# Patient Record
Sex: MALE | Race: ASIAN | NOT HISPANIC OR LATINO | URBAN - METROPOLITAN AREA
[De-identification: names, ages, dates, MRNs, and addresses within clinical notes are randomized per-mention and may not be internally consistent; named-entity substitution may affect disease eponyms.]

---

## 2020-08-03 ENCOUNTER — LAB OUTSIDE AN ENCOUNTER (OUTPATIENT)
Dept: URBAN - METROPOLITAN AREA CLINIC 86 | Facility: CLINIC | Age: 35
End: 2020-08-03

## 2020-08-04 ENCOUNTER — TELEPHONE ENCOUNTER (OUTPATIENT)
Dept: URBAN - METROPOLITAN AREA CLINIC 92 | Facility: CLINIC | Age: 35
End: 2020-08-04

## 2020-08-04 NOTE — HPI-OTHER HISTORIES
Dear  Phol, Aug 3 2020 u.s: [facility]: Liver 17.1cm and prior 16.8cm so similar. Spleen 13.5cm and prior 13.2cm. No focal liver lesions. No ascites. Liver vessels patent.  (this seems to be very different from 2nd report sent on you today also. U.s additional report mentions: No liver lesions. Spleen 12.6cm. 6mm polyp in gb neck and similar to prior exam. Common duct 4mm. No hydronephrosis. No change vs jan 2020.  Called [facility]: they confirmed: the original study you did was the full u/s and then they did the doppler. Tech retook readings on doppler and is known to happen, the readings could be off by 1cm or so and so that is why looked bigger.  Radiology also felt one reading of the spleen little larger than he would suspect and closer to 12.8cm.   The good news is that there are no lesions and gb polyp is stable. These readings as you see can vary even same day depending on how they set the measures.   Dr Gonzalez

## 2020-08-05 ENCOUNTER — TELEPHONE ENCOUNTER (OUTPATIENT)
Dept: URBAN - METROPOLITAN AREA CLINIC 92 | Facility: CLINIC | Age: 35
End: 2020-08-05

## 2020-08-05 LAB
A/G RATIO: 1.8
ALBUMIN: 4.8
ALKALINE PHOSPHATASE: 60
ALT (SGPT): 28
AST (SGOT): 22
BASO (ABSOLUTE): 0
BASOS: 0
BILIRUBIN, TOTAL: 0.5
BUN/CREATININE RATIO: 25
BUN: 19
CALCIUM: 9.1
CARBON DIOXIDE, TOTAL: 22
CHLORIDE: 100
CREATININE: 0.77
EGFR IF AFRICN AM: 137
EGFR IF NONAFRICN AM: 118
EOS (ABSOLUTE): 0.2
EOS: 2
GLOBULIN, TOTAL: 2.6
GLUCOSE: 85
HBSAG CONFIRMATION: POSITIVE
HBSAG SCREEN: (no result)
HBV GENOTYPE + DRUG RESISTANCE: (no result)
HBV IU/ML: <10
HEMATOCRIT: 44.2
HEMATOLOGY COMMENTS:: (no result)
HEMOGLOBIN: 14.5
IMMATURE CELLS: (no result)
IMMATURE GRANS (ABS): 0
IMMATURE GRANULOCYTES: 0
LOG10 HBV AS IU/ML: (no result)
LYMPHS (ABSOLUTE): 2
LYMPHS: 29
MCH: 30.1
MCHC: 32.8
MCV: 92
MONOCYTES(ABSOLUTE): 0.6
MONOCYTES: 8
NEUTROPHILS (ABSOLUTE): 4.3
NEUTROPHILS: 61
NRBC: (no result)
PLATELETS: 236
POTASSIUM: 5
PROTEIN, TOTAL: 7.4
RBC: 4.81
RDW: 12.2
SODIUM: 138
TEST INFORMATION:: (no result)
WBC: 7

## 2020-08-05 NOTE — HPI-OTHER HISTORIES
Dear Marita Sabillon The results of your recent tests are explained below: hep b sag positive. wbc 7.0 hg 14.5 plat 236. mcv 92.  tb 0.5 and alk 60 and ast 22 and alt 28. na 138 and k 5.0 and glu 85 and cr 0.77.  HBV <10.

## 2021-01-25 ENCOUNTER — TELEPHONE ENCOUNTER (OUTPATIENT)
Dept: URBAN - METROPOLITAN AREA CLINIC 86 | Facility: CLINIC | Age: 36
End: 2021-01-25

## 2021-01-26 ENCOUNTER — OFFICE VISIT (OUTPATIENT)
Dept: URBAN - METROPOLITAN AREA CLINIC 86 | Facility: CLINIC | Age: 36
End: 2021-01-26

## 2021-02-18 ENCOUNTER — TELEPHONE ENCOUNTER (OUTPATIENT)
Dept: URBAN - METROPOLITAN AREA CLINIC 92 | Facility: CLINIC | Age: 36
End: 2021-02-18

## 2021-02-18 NOTE — HPI-OTHER HISTORIES
Cirilo Sabillon, Feb 2021: u.s:  no focal hepatic abnormality. No liver lesions. Liver appears unchanged.  Gallbladder polyp 7mm without significant change from prior exam, Spleen normal in size. No kidney hydronephrosis seen. Pancreas obscured by gas. Common duct normal.   Dr Gonzalez

## 2021-02-23 ENCOUNTER — OFFICE VISIT (OUTPATIENT)
Dept: URBAN - METROPOLITAN AREA CLINIC 86 | Facility: CLINIC | Age: 36
End: 2021-02-23
Payer: COMMERCIAL

## 2021-02-23 ENCOUNTER — LAB OUTSIDE AN ENCOUNTER (OUTPATIENT)
Dept: URBAN - METROPOLITAN AREA CLINIC 86 | Facility: CLINIC | Age: 36
End: 2021-02-23

## 2021-02-23 DIAGNOSIS — E66.3 OVERWEIGHT: ICD-10-CM

## 2021-02-23 DIAGNOSIS — Z71.89 VACCINE COUNSELING: ICD-10-CM

## 2021-02-23 DIAGNOSIS — Z79.899 ENCOUNTER FOR LONG-TERM (CURRENT) USE OF HIGH-RISK MEDICATION: ICD-10-CM

## 2021-02-23 DIAGNOSIS — B18.1 CHRONIC HEPATITIS B: ICD-10-CM

## 2021-02-23 DIAGNOSIS — Z98.89 HISTORY OF LIVER BIOPSY: ICD-10-CM

## 2021-02-23 DIAGNOSIS — R89.4 HEPATITIS A ANTIBODY POSITIVE: ICD-10-CM

## 2021-02-23 DIAGNOSIS — K82.4 GALL BLADDER POLYP: ICD-10-CM

## 2021-02-23 PROBLEM — 66071002 HEPATITIS B: Status: ACTIVE | Noted: 2021-02-23

## 2021-02-23 PROCEDURE — 99214 OFFICE O/P EST MOD 30 MIN: CPT

## 2021-02-23 RX ORDER — TENOFOVIR ALAFENAMIDE 25 MG/1
1 TABLET WITH FOOD TABLET ORAL ONCE A DAY
Qty: 30 | Refills: 4
End: 2021-07-23

## 2021-02-23 RX ORDER — TENOFOVIR ALAFENAMIDE 25 MG/1
1 TABLET WITH FOOD TABLET ORAL ONCE A DAY
Status: ACTIVE | COMMUNITY

## 2021-02-23 NOTE — HPI-OTHER HISTORIES
This is a scheduled follow-up appointment for this patient, a 35 year old  male, after a previous visit in jan 2020, for an evaluation for hepatitis B.    He has completed 6 years of treatment with viread. He has been now on vemlidy since spring 2017. The patient has been compliant with his treatment regimen. He has been abstinent from alcohol.      Pt here for follow up for hep b. on vemlidy tx. He has fatty liver on imaging and prior had stated that he was drinking a lot of sodas and we recommended he avoid this.   doing well.  did labs on friday will need those results.   Feb 2021: u.s:  no focal hepatic abnormality. No liver lesions. Liver appears unchanged.  Gallbladder polyp 7mm without significant change from prior exam, Spleen normal in size. No kidney hydronephrosis seen. Pancreas obscured by gas. Common duct normal. Dear Marita Sabillon,  last labs were in aug 2020, need updated labs.  Jan 16 2020 liver mildly increased in echogenicity and liver wnl. Right lobe 14cm and no focal mass. Stable gb focus 7mm and no gallstones. cbd 4mm. No evidence of hydronephrosis. spleen 12.5cmx12.8x5.6cm. Stable gb polyp.                                          Shiv 15 2020 wbc 8.2 hg 14.1 and plat 239 and glu 99 and cr 0.79 and na 140 and k 4.5 and cl 103 and co2 21 and alb 4.6 and tb 0.4 and alk 58 and ast 22 and alt 30 hbv dna not detected.  Dec 4 2019 wbc 7.5 hg 14 and plat 225. glu 95 and cr 0.81 and na 140 and k 4.6 and cl 102 and co2 21 and alb 4.7 and tb 0.7 and 59 andast 21 and alt 27 hbv dna not detected.  7/15/19 wbc 7.2 hgb 14.9 plt 210 gluc 101 cr 0.81 alp 58 ast 21 alt 29 tb 0.7 hep b dna neg

## 2021-02-23 NOTE — PHYSICAL EXAM CHEST:
no lesions,  no deformities,  no traumatic injuries,  no significant scars are present,  chest wall non-tender,  no masses present, breathing is unlabored without accessory muscle use,normal breath sounds , no lesions,  no deformities,  no traumatic injuries,  no significant scars are present,  chest wall non-tender,  no masses present,  tactile fremitus is normal, breathing is unlabored without accessory muscle use,normal breath sounds

## 2021-03-01 ENCOUNTER — TELEPHONE ENCOUNTER (OUTPATIENT)
Dept: URBAN - METROPOLITAN AREA CLINIC 6 | Facility: CLINIC | Age: 36
End: 2021-03-01

## 2021-03-01 RX ORDER — TENOFOVIR ALAFENAMIDE 25 MG/1
1 TABLET WITH FOOD TABLET ORAL ONCE A DAY
Qty: 30 | Refills: 0

## 2021-03-10 ENCOUNTER — TELEPHONE ENCOUNTER (OUTPATIENT)
Dept: URBAN - METROPOLITAN AREA CLINIC 92 | Facility: CLINIC | Age: 36
End: 2021-03-10

## 2021-03-10 RX ORDER — TENOFOVIR ALAFENAMIDE 25 MG/1
1 TABLET WITH FOOD TABLET ORAL ONCE A DAY
Qty: 30 TABLET | Refills: 0 | OUTPATIENT

## 2021-05-23 ENCOUNTER — LAB OUTSIDE AN ENCOUNTER (OUTPATIENT)
Dept: URBAN - METROPOLITAN AREA CLINIC 86 | Facility: CLINIC | Age: 36
End: 2021-05-23

## 2021-08-10 ENCOUNTER — TELEPHONE ENCOUNTER (OUTPATIENT)
Dept: URBAN - METROPOLITAN AREA CLINIC 92 | Facility: CLINIC | Age: 36
End: 2021-08-10

## 2021-08-15 ENCOUNTER — TELEPHONE ENCOUNTER (OUTPATIENT)
Dept: URBAN - METROPOLITAN AREA CLINIC 92 | Facility: CLINIC | Age: 36
End: 2021-08-15

## 2021-08-15 NOTE — HPI-TODAY'S VISIT:
Cirilo Sabillon, August 12 ultrasound shows casted limit the exam. Liver measured 15 cm and the spleen was approximately 13 x 12.6 x 5 cm.  Flow was seen in the portal and hepatic veins with normal physiologic direction. Hepatofugal flow was seen in the hepatic veins. Overall they did feel that the exam was technically difficult but that the patency of the vessels was seen and that the spleen was prominent at 13 cm. Dr. Gonzalez

## 2021-08-23 ENCOUNTER — LAB OUTSIDE AN ENCOUNTER (OUTPATIENT)
Dept: URBAN - METROPOLITAN AREA CLINIC 86 | Facility: CLINIC | Age: 36
End: 2021-08-23

## 2021-08-26 ENCOUNTER — OFFICE VISIT (OUTPATIENT)
Dept: URBAN - METROPOLITAN AREA CLINIC 86 | Facility: CLINIC | Age: 36
End: 2021-08-26
Payer: COMMERCIAL

## 2021-08-26 DIAGNOSIS — Z79.899 ENCOUNTER FOR LONG-TERM (CURRENT) USE OF HIGH-RISK MEDICATION: ICD-10-CM

## 2021-08-26 DIAGNOSIS — K82.4 GALL BLADDER POLYP: ICD-10-CM

## 2021-08-26 DIAGNOSIS — M54.30 SCIATIC LEG PAIN: ICD-10-CM

## 2021-08-26 DIAGNOSIS — E66.3 OVERWEIGHT: ICD-10-CM

## 2021-08-26 DIAGNOSIS — Z71.89 VACCINE COUNSELING: ICD-10-CM

## 2021-08-26 DIAGNOSIS — B18.1 CHRONIC HEPATITIS B: ICD-10-CM

## 2021-08-26 DIAGNOSIS — R89.4 HEPATITIS A ANTIBODY POSITIVE: ICD-10-CM

## 2021-08-26 DIAGNOSIS — E66.09 OTHER OBESITY DUE TO EXCESS CALORIES: ICD-10-CM

## 2021-08-26 DIAGNOSIS — Z98.89 HISTORY OF LIVER BIOPSY: ICD-10-CM

## 2021-08-26 PROCEDURE — 99214 OFFICE O/P EST MOD 30 MIN: CPT

## 2021-08-26 RX ORDER — TENOFOVIR ALAFENAMIDE 25 MG/1
1 TABLET WITH FOOD TABLET ORAL ONCE A DAY
Qty: 30 TABLET | Refills: 5

## 2021-08-26 RX ORDER — TENOFOVIR ALAFENAMIDE 25 MG/1
1 TABLET WITH FOOD TABLET ORAL ONCE A DAY
Qty: 30 TABLET | Refills: 0 | Status: ACTIVE | COMMUNITY

## 2021-08-26 NOTE — HPI-OTHER HISTORIES
This is a scheduled follow-up appointment for this patient, a 35 year old  male, after a previous visit in Feb 2021 , for an evaluation for hepatitis B.     He had completed 6 years of treatment with viread. He has been now on vemlidy since spring 2017. The patient has been compliant with his treatment regimen.    He has been gaining weight with the pandemic and he has gained about 10 pounds iwith the pandemic.   Aug 20 2021 glu 109 and bun 23 and cr 0.83 and na 141 and k 4.4 and cl 105 and co2 22 and alb 4.7 and tb 0.4 and alk 58 and ast 20 and alt 30 and wbc 8.9 and hg 14 and mcv 93 and plat 253.  hbv pending.   August 12 ultrasound shows technically limited exam. Liver measured 15 cm and the spleen was approximately 13 x 12.6 x 5 cm.  Flow was seen in the portal and hepatic veins with normal physiologic direction. Hepatofugal flow was seen in the hepatic veins. Overall they did feel that the exam was technically difficult but that the patency of the vessels was seen and that the spleen was prominent at 13 cm.  He had sciatica and steroids shots x 2 for this pain and he says sciatica better.   Feb 2021: u.s:  no focal hepatic abnormality. No liver lesions. Liver appears unchanged.  Gallbladder polyp 7mm without significant change from prior exam, Spleen normal in size. No kidney hydronephrosis seen. Pancreas obscured by gas. Common duct normal.   Jan 16 2020 liver mildly increased in echogenicity and liver wnl. Right lobe 14cm and no focal mass. Stable gb focus 7mm and no gallstones. cbd 4mm. No evidence of hydronephrosis. spleen 12.5cmx12.8x5.6cm. Stable gb polyp.                                          Shiv 15 2020 wbc 8.2 hg 14.1 and plat 239 and glu 99 and cr 0.79 and na 140 and k 4.5 and cl 103 and co2 21 and alb 4.6 and tb 0.4 and alk 58 and ast 22 and alt 30 hbv dna not detected.  Dec 4 2019 wbc 7.5 hg 14 and plat 225. glu 95 and cr 0.81 and na 140 and k 4.6 and cl 102 and co2 21 and alb 4.7 and tb 0.7 and 59 andast 21 and alt 27 hbv dna not detected.  7/15/19 wbc 7.2 hgb 14.9 plt 210 gluc 101 cr 0.81 alp 58 ast 21 alt 29 tb 0.7 hep b dna neg   Plan: 1. Need to try to walk every day 15-30 min to help with the risk for weight gain. 2. Sciatica affected him. 3. Pt will do labs in 3 and 6m. 4. U,s in 6m and hopefully get better look. 5. Pt will stay on the vemlidy,  Stressed to pt the need for social distancing and strict handwashing and wearing a mask and to follow any other new or added CDC recommendations as this is an evolving target.   Duration of the visit was 30  minutes of this face to face visit with time spent eviewing recent records current status and future plans for the patient.

## 2021-08-26 NOTE — EXAM-PHYSICAL EXAM
Gen: awake and responsive. Eyes: anicteric, normal lids. Mouth: covered with mask. Nose: covered with mask. Hearing: intact grossly. Neck: trachea midline and no jvd. CV: RRR no s3. Lungs: clear. No wheezes, Abd: Soft, nabs, mildly obese and NT. No hsm. ExtL no sig edema, some palm erythema. Neuro: moves all 4 ext grossly. No asterixis. Skin: no pruritis and some palm erythema.

## 2021-08-27 LAB
A/G RATIO: 1.8
ALBUMIN: 4.7
ALKALINE PHOSPHATASE: 58
ALT (SGPT): 30
AST (SGOT): 20
BASO (ABSOLUTE): 0.1
BASOS: 1
BILIRUBIN, TOTAL: 0.4
BUN/CREATININE RATIO: 28
BUN: 23
CALCIUM: 9.2
CARBON DIOXIDE, TOTAL: 22
CHLORIDE: 105
CREATININE: 0.83
EGFR IF AFRICN AM: 132
EGFR IF NONAFRICN AM: 114
EOS (ABSOLUTE): 0.2
EOS: 2
GLOBULIN, TOTAL: 2.6
GLUCOSE: 109
HBV LOG10: (no result)
HEMATOCRIT: 43.3
HEMATOLOGY COMMENTS:: (no result)
HEMOGLOBIN: 14
HEPATITIS B QUANTITATION: (no result)
IMMATURE CELLS: (no result)
IMMATURE GRANS (ABS): 0
IMMATURE GRANULOCYTES: 0
LYMPHS (ABSOLUTE): 2.4
LYMPHS: 27
MCH: 30.1
MCHC: 32.3
MCV: 93
MONOCYTES(ABSOLUTE): 0.6
MONOCYTES: 7
NEUTROPHILS (ABSOLUTE): 5.7
NEUTROPHILS: 63
NRBC: (no result)
PLATELETS: 253
POTASSIUM: 4.4
PROTEIN, TOTAL: 7.3
RBC: 4.65
RDW: 12.3
SODIUM: 141
TEST INFORMATION:: (no result)
WBC: 8.9

## 2021-11-15 ENCOUNTER — LAB OUTSIDE AN ENCOUNTER (OUTPATIENT)
Dept: URBAN - METROPOLITAN AREA CLINIC 86 | Facility: CLINIC | Age: 36
End: 2021-11-15

## 2022-01-31 ENCOUNTER — ERX REFILL RESPONSE (OUTPATIENT)
Dept: URBAN - METROPOLITAN AREA CLINIC 86 | Facility: CLINIC | Age: 37
End: 2022-01-31

## 2022-01-31 RX ORDER — TENOFOVIR ALAFENAMIDE 25 MG/1
1 TABLET WITH FOOD TABLET ORAL ONCE A DAY
Qty: 30 TABLET | Refills: 5 | OUTPATIENT

## 2022-01-31 RX ORDER — TENOFOVIR ALAFENAMIDE 25 MG/1
TAKE 1 TABLET BY MOUTH ONCE DAILY WITH FOOD TABLET ORAL
Qty: 30 TABLET | Refills: 1 | OUTPATIENT

## 2022-02-14 ENCOUNTER — LAB OUTSIDE AN ENCOUNTER (OUTPATIENT)
Dept: URBAN - METROPOLITAN AREA CLINIC 86 | Facility: CLINIC | Age: 37
End: 2022-02-14

## 2022-02-15 ENCOUNTER — LAB OUTSIDE AN ENCOUNTER (OUTPATIENT)
Dept: URBAN - METROPOLITAN AREA CLINIC 86 | Facility: CLINIC | Age: 37
End: 2022-02-15

## 2022-02-22 ENCOUNTER — TELEPHONE ENCOUNTER (OUTPATIENT)
Dept: URBAN - METROPOLITAN AREA CLINIC 92 | Facility: CLINIC | Age: 37
End: 2022-02-22

## 2022-02-22 NOTE — HPI-TODAY'S VISIT:
Cirilo Sabillon, February 17 [place] [facility] report in. Liver showed slightly increased echogenicity without definite focal lesion. Spleen was normal at 12.3 cm.  Liver vessels patent. Overall they felt the liver was fatty and that you had patent vessels. Dr. Gonzalez

## 2022-03-01 ENCOUNTER — TELEPHONE ENCOUNTER (OUTPATIENT)
Dept: URBAN - METROPOLITAN AREA CLINIC 92 | Facility: CLINIC | Age: 37
End: 2022-03-01

## 2022-03-01 LAB
A/G RATIO: 1.8
ALBUMIN: 4.7
ALKALINE PHOSPHATASE: 64
ALT (SGPT): 34
AST (SGOT): 24
BASO (ABSOLUTE): 0.1
BASOS: 1
BILIRUBIN, TOTAL: 0.6
BUN/CREATININE RATIO: 20
BUN: 17
CALCIUM: 9.2
CARBON DIOXIDE, TOTAL: 22
CHLORIDE: 103
CREATININE: 0.87
EGFR IF AFRICN AM: 128
EGFR IF NONAFRICN AM: 111
EOS (ABSOLUTE): 0.3
EOS: 3
GLOBULIN, TOTAL: 2.6
GLUCOSE: 99
HBSAG CONFIRMATION: POSITIVE
HBSAG SCREEN: (no result)
HBV LOG10: (no result)
HEMATOCRIT: 44.2
HEMATOLOGY COMMENTS:: (no result)
HEMOGLOBIN: 14.5
HEPATITIS B QUANTITATION: (no result)
IMMATURE CELLS: (no result)
IMMATURE GRANS (ABS): 0
IMMATURE GRANULOCYTES: 0
LYMPHS (ABSOLUTE): 2.1
LYMPHS: 24
MCH: 30.5
MCHC: 32.8
MCV: 93
MONOCYTES(ABSOLUTE): 0.7
MONOCYTES: 8
NEUTROPHILS (ABSOLUTE): 5.7
NEUTROPHILS: 64
NRBC: (no result)
PLATELETS: 217
POTASSIUM: 4.6
PROTEIN, TOTAL: 7.3
RBC: 4.76
RDW: 12.3
SODIUM: 139
TEST INFORMATION:: (no result)
WBC: 9

## 2022-03-01 NOTE — HPI-TODAY'S VISIT:
Cirilo Sabillon. Feb 15: hbv dna not detected so well suppressed. Glu 99 and bun 17 and cr 0.87 (up from 0.77 but still normal.  Sodium 139 potassium 4.6 calcium 9.2 albumin 4.7 bilirubin 0.6 alkaline phosphatase 64 AST 24 and ALT 34.  Previously AST 22 and ALT 28.  Ideal ALT is less than 35.  Is anything changing to explain why the liver labs are drifting up?  White blood cell count 9 hemoglobin 14.5 platelet count 217 MCV 93.  Neutrophils 5.7 lymphocytes 2.1. Hep B surface antigen remains positive. Need to follow labs every 3m. Dr Gonzalez

## 2022-03-03 ENCOUNTER — OFFICE VISIT (OUTPATIENT)
Dept: URBAN - METROPOLITAN AREA CLINIC 86 | Facility: CLINIC | Age: 37
End: 2022-03-03
Payer: COMMERCIAL

## 2022-03-03 ENCOUNTER — LAB OUTSIDE AN ENCOUNTER (OUTPATIENT)
Dept: URBAN - METROPOLITAN AREA CLINIC 86 | Facility: CLINIC | Age: 37
End: 2022-03-03

## 2022-03-03 DIAGNOSIS — M54.30 SCIATIC LEG PAIN: ICD-10-CM

## 2022-03-03 DIAGNOSIS — Z79.899 ENCOUNTER FOR LONG-TERM (CURRENT) USE OF HIGH-RISK MEDICATION: ICD-10-CM

## 2022-03-03 DIAGNOSIS — K82.4 GALL BLADDER POLYP: ICD-10-CM

## 2022-03-03 DIAGNOSIS — B18.1 CHRONIC HEPATITIS B: ICD-10-CM

## 2022-03-03 DIAGNOSIS — Z71.89 VACCINE COUNSELING: ICD-10-CM

## 2022-03-03 DIAGNOSIS — E66.3 OVERWEIGHT: ICD-10-CM

## 2022-03-03 DIAGNOSIS — R89.4 HEPATITIS A ANTIBODY POSITIVE: ICD-10-CM

## 2022-03-03 DIAGNOSIS — E66.09 OTHER OBESITY DUE TO EXCESS CALORIES: ICD-10-CM

## 2022-03-03 PROCEDURE — 99214 OFFICE O/P EST MOD 30 MIN: CPT

## 2022-03-03 RX ORDER — TENOFOVIR ALAFENAMIDE 25 MG/1
1 TABLET WITH FOOD TABLET ORAL ONCE A DAY
Qty: 30 TABLET | Refills: 5

## 2022-03-03 RX ORDER — TENOFOVIR ALAFENAMIDE 25 MG/1
TAKE 1 TABLET BY MOUTH ONCE DAILY WITH FOOD TABLET ORAL
Qty: 30 TABLET | Refills: 1 | Status: ACTIVE | COMMUNITY

## 2022-03-10 ENCOUNTER — TELEPHONE ENCOUNTER (OUTPATIENT)
Dept: URBAN - METROPOLITAN AREA CLINIC 92 | Facility: CLINIC | Age: 37
End: 2022-03-10

## 2022-03-10 NOTE — HPI-TODAY'S VISIT:
Dear Marita Sabillon, February 22 labs able to be tracked down.   White blood cell count 9 hemoglobin 13.5 platelet count 217 MCV 93 and neutrophils 5.7 and lymphocytes 2.1.  These are all normal range.  Glucose better at 99 BUN is 17 creatinine 0.87 sodium 139 potassium 4.6 chloride 103 CO2 of 22 albumin 4.7 bilirubin 0.6 alkaline phosphatase 64 AST 24 ALT 34.  Previously alk phos 58 AST 20 and ALT 30 so these labs are very close to that size but slightly higher.  Ideal ALT is still less than 35 so doing well.  Hep B surface antigen is positive. Have seen in many pts, that during winter some weight gain and decreased exercise can affect the liver labs. Hopefully better weather now will allow for more ability to do exercise and work on the weight to help this. Dr Gonzalez

## 2022-06-03 ENCOUNTER — LAB OUTSIDE AN ENCOUNTER (OUTPATIENT)
Dept: URBAN - METROPOLITAN AREA CLINIC 86 | Facility: CLINIC | Age: 37
End: 2022-06-03

## 2022-08-22 ENCOUNTER — LAB OUTSIDE AN ENCOUNTER (OUTPATIENT)
Dept: URBAN - METROPOLITAN AREA CLINIC 86 | Facility: CLINIC | Age: 37
End: 2022-08-22

## 2022-09-01 ENCOUNTER — TELEPHONE ENCOUNTER (OUTPATIENT)
Dept: URBAN - METROPOLITAN AREA CLINIC 92 | Facility: CLINIC | Age: 37
End: 2022-09-01

## 2022-09-01 LAB
A/G RATIO: 1.6
ALBUMIN: 4.5
ALKALINE PHOSPHATASE: 62
ALT (SGPT): 27
AST (SGOT): 19
BASO (ABSOLUTE): 0
BASOS: 0
BILIRUBIN, TOTAL: 0.5
BUN/CREATININE RATIO: 20
BUN: 17
CALCIUM: 9.2
CARBON DIOXIDE, TOTAL: 25
CHLORIDE: 104
CREATININE: 0.84
EGFR: 116
EOS (ABSOLUTE): 0.2
EOS: 2
GLOBULIN, TOTAL: 2.8
GLUCOSE: 105
HBSAG CONFIRMATION: POSITIVE
HBSAG SCREEN: (no result)
HBV LOG10: (no result)
HEMATOCRIT: 45.6
HEMATOLOGY COMMENTS:: (no result)
HEMOGLOBIN: 14.3
HEPATITIS B QUANTITATION: (no result)
IMMATURE CELLS: (no result)
IMMATURE GRANS (ABS): 0
IMMATURE GRANULOCYTES: 0
LYMPHS (ABSOLUTE): 2.8
LYMPHS: 31
MCH: 29.2
MCHC: 31.4
MCV: 93
MONOCYTES(ABSOLUTE): 0.7
MONOCYTES: 8
NEUTROPHILS (ABSOLUTE): 5.3
NEUTROPHILS: 59
NRBC: (no result)
PLATELETS: 235
POTASSIUM: 4.2
PROTEIN, TOTAL: 7.3
RBC: 4.89
RDW: 12.3
SODIUM: 141
TEST INFORMATION:: (no result)
WBC: 9.1

## 2022-09-01 NOTE — HPI-TODAY'S VISIT:
Dear Marita Sabillon, August 23 labs show glucose slightly up at 105 and possibly you were not fasting this day?  Please share with primary provider. BUN of 17 creatinine 0.84 sodium 141 potassium 4.2 calcium 9.2 albumin 4.5 bilirubin 0.5 alkaline phosphatase 62 AST 19 ALT 27.  Back in February her AST was 24 and ALT 34 so these are even lower.  Any changes of weight or medicines on to explain this?   White blood cell count 9.1 hemoglobin 14.3 platelet count 235 MCV 93 and these are normal.  Your mean corpuscular hemoglobin concentration which was previously normal was slightly low this time at 31.4 and please share with primary provider. Neutrophils were normal at 5.3 lymphocytes 2.8.  Hep B surface antigen remains confirmed indicated and positive on the confirmation. Hep B DNA remains not detected. Dr. Gonzalez

## 2022-09-02 ENCOUNTER — OFFICE VISIT (OUTPATIENT)
Dept: URBAN - METROPOLITAN AREA TELEHEALTH 2 | Facility: TELEHEALTH | Age: 37
End: 2022-09-02
Payer: COMMERCIAL

## 2022-09-02 VITALS — HEIGHT: 68 IN | BODY MASS INDEX: 33.34 KG/M2 | WEIGHT: 220 LBS

## 2022-09-02 DIAGNOSIS — K82.4 GALL BLADDER POLYP: ICD-10-CM

## 2022-09-02 DIAGNOSIS — Z71.89 VACCINE COUNSELING: ICD-10-CM

## 2022-09-02 DIAGNOSIS — B18.1 CHRONIC HEPATITIS B: ICD-10-CM

## 2022-09-02 DIAGNOSIS — K76.0 FATTY LIVER: ICD-10-CM

## 2022-09-02 DIAGNOSIS — R89.4 HEPATITIS A ANTIBODY POSITIVE: ICD-10-CM

## 2022-09-02 DIAGNOSIS — E66.09 OTHER OBESITY DUE TO EXCESS CALORIES: ICD-10-CM

## 2022-09-02 DIAGNOSIS — M54.30 SCIATIC LEG PAIN: ICD-10-CM

## 2022-09-02 DIAGNOSIS — E66.3 OVERWEIGHT: ICD-10-CM

## 2022-09-02 PROCEDURE — 99214 OFFICE O/P EST MOD 30 MIN: CPT

## 2022-09-02 RX ORDER — TENOFOVIR ALAFENAMIDE 25 MG/1
1 TABLET WITH FOOD TABLET ORAL ONCE A DAY
Qty: 30 TABLET | Refills: 5

## 2022-09-02 RX ORDER — TENOFOVIR ALAFENAMIDE 25 MG/1
1 TABLET WITH FOOD TABLET ORAL ONCE A DAY
Qty: 30 TABLET | Refills: 5 | Status: ACTIVE | COMMUNITY

## 2022-09-02 NOTE — HPI-TODAY'S VISIT:
This is a scheduled follow-up appointment for this patient, a 36 year old  male, after a previous visit in March 2022 , for an evaluation for hepatitis B and on tx response.  He had completed 6 years of treatment with viread. He has been now on vemlidy since spring 2017. The patient has been compliant with his treatment regimen.   August 23 labs show glucose slightly up at 105 and possibly you were not fasting this day?  Please share with primary provider. BUN of 17 creatinine 0.84 sodium 141 potassium 4.2 calcium 9.2 albumin 4.5 bilirubin 0.5 alkaline phosphatase 62 AST 19 ALT 27.  Back in February her AST was 24 and ALT 34 so these are even lower.  Any changes of weight or medicines on to explain this?   He says that may have lost some weight. Working out. White blood cell count 9.1 hemoglobin 14.3 platelet count 235 MCV 93 and these are normal.  Your mean corpuscular hemoglobin concentration which was previously normal was slightly low this time at 31.4 and please share with primary provider. Neutrophils were normal at 5.3 lymphocytes 2.8.  Hep B surface antigen remains confirmed indicated and positive on the confirmation. Hep B DNA remains not detected.  As for u.s done last fri at [facility] in [place].  February 22 labs able to be tracked down.   White blood cell count 9 hemoglobin 13.5 platelet count 217 MCV 93 and neutrophils 5.7 and lymphocytes 2.1.  These are all normal range.  Glucose better at 99 BUN is 17 creatinine 0.87 sodium 139 potassium 4.6 chloride 103 CO2 of 22 albumin 4.7 bilirubin 0.6 alkaline phosphatase 64 AST 24 ALT 34.  Previously alk phos 58 AST 20 and ALT 30 so these labs are very close to that size but slightly higher.  Ideal ALT is still less than 35 so doing well.  Hep B surface antigen is positive. Have seen in many pts, that during winter some weight gain and decreased exercise can affect the liver labs. Hopefully better weather now will allow for more ability to do exercise and work on the weight to help this.  Last visit he thought had gained 10 pounds and he thinks he has lost some of it.   February 17 [place] [facility] report in. Liver showed slightly increased echogenicity without definite focal lesion. Spleen was normal at 12.3 cm.  Liver vessels patent. Overall they felt the liver was fatty and that you had patent vessels.   Aug 20 2021 glu 109 and bun 23 and cr 0.83 and na 141 and k 4.4 and cl 105 and co2 22 and alb 4.7 and tb 0.4 and alk 58 and ast 20 and alt 30 and wbc 8.9 and hg 14 and mcv 93 and plat 253.  hbv pending.   August 12 ultrasound shows technically limited exam. Liver measured 15 cm and the spleen was approximately 13 x 12.6 x 5 cm.  Flow was seen in the portal and hepatic veins with normal physiologic direction. Hepatofugal flow was seen in the hepatic veins. Overall they did feel that the exam was technically difficult but that the patency of the vessels was seen and that the spleen was prominent at 13 cm.  He had sciatica and steroids shots x 2 for this pain prior and he says sciatica is off and on.  Feb 2021: u.s:  no focal hepatic abnormality. No liver lesions. Liver appears unchanged.  Gallbladder polyp 7mm without significant change from prior exam, Spleen normal in size. No kidney hydronephrosis seen. Pancreas obscured by gas. Common duct normal.   Jan 16 2020 liver mildly increased in echogenicity and liver wnl. Right lobe 14cm and no focal mass. Stable gb focus 7mm and no gallstones. cbd 4mm. No evidence of hydronephrosis. spleen 12.5cmx12.8x5.6cm. Stable gb polyp.                                          Shiv 15 2020 wbc 8.2 hg 14.1 and plat 239 and glu 99 and cr 0.79 and na 140 and k 4.5 and cl 103 and co2 21 and alb 4.6 and tb 0.4 and alk 58 and ast 22 and alt 30 hbv dna not detected.  Dec 4 2019 wbc 7.5 hg 14 and plat 225. glu 95 and cr 0.81 and na 140 and k 4.6 and cl 102 and co2 21 and alb 4.7 and tb 0.7 and 59 andast 21 and alt 27 hbv dna not detected.  7/15/19 wbc 7.2 hgb 14.9 plt 210 gluc 101 cr 0.81 alp 58 ast 21 alt 29 tb 0.7 hep b dna neg   Plan: 1. [facility]s in 6m at [facility] and check one he did. 2. Pt will do lans in 3m and 6m. 3. Pt will call if issues. 4. vemldiy via [facility] rx.   Stressed to pt the need for social distancing and strict handwashing and wearing a mask and to follow any other new or added CDC recommendations as this is an evolving target.  Duration of the visit was 30 minutes with 10 min of chart prep and then 20 min via [employer]  with time spent reviewing recent records, his  current status and future plans for the patient.

## 2022-09-03 ENCOUNTER — LAB OUTSIDE AN ENCOUNTER (OUTPATIENT)
Dept: URBAN - METROPOLITAN AREA CLINIC 86 | Facility: CLINIC | Age: 37
End: 2022-09-03

## 2022-09-04 ENCOUNTER — TELEPHONE ENCOUNTER (OUTPATIENT)
Dept: URBAN - METROPOLITAN AREA CLINIC 92 | Facility: CLINIC | Age: 37
End: 2022-09-04

## 2022-09-04 NOTE — HPI-TODAY'S VISIT:
Dear Marita Sabillon, They were able to get the August 26 ultrasound from [facility] in [place] scanned in. They mention that the exam was impaired by body habitus but that the vascular flow in the main, left, and right portal veins as well as the 3 hepatic veins and hepatic artery was seen and appeared to be patent as dxpected. The liver appeared normal.   Pancreas was normal were seen. There was a 6-7 mm possible polyp versus adherent stone seen in the gallbladder and that it was seen back in 2018 as well.  They stated that it overall appeared stable to 2018. GB wall 3mm which was also stable. Common bile duct was nondilated. Kidneys were normal.  Spleen was 12.3 cm. The overall they felt that you had a borderline enlarged spleen but with no vascular abnormality seen to the liver.  Typically for the gallbladder polyp, we will check this again in 6 months as well.   Good to note the stability seen as it does not appear to be showing any signs of change for 4 yrs now. Dr Gonzalez

## 2022-10-12 ENCOUNTER — TELEPHONE ENCOUNTER (OUTPATIENT)
Dept: URBAN - METROPOLITAN AREA CLINIC 92 | Facility: CLINIC | Age: 37
End: 2022-10-12

## 2022-10-12 NOTE — HPI-TODAY'S VISIT:
Cirilo Sabillon,  Aug 26: 2022:  Exam impaired by body habitus. Flow was seen in the main and left and right portal veins and liver normal. Pancreas normal where seen. Stone 6x7mm and polyp or stone. Seen dating back to 2018. Borderline wall thickening at 3mm and stable to 2018. CBD no dilation and spleen 12.3cm and roughlsimlar to prior. No vascular abnormalities. Aug 29 signed Dr Alexandru Chew radiologist.  They also had sent today the Aug 12 2021 u.s on Oct 10 2022 reread with an addendum: Upper abdominal aorta and ivc are unremarkable. Pancreas is partly seen demonstrating no abnormalities. Liver is borderline in echogenicity likely due to fatty infiltration or chronic inflammation. Right kidney 12.1 cm. Cortical medullary differentiation is maintained.  No hydronephrosis is identified. 7x5 mm gallstone with no wall thickening or pericholecystic fluid.  Common bile duct is 4.3mm. Right common iliac is 1.2x1.3cm  and upper left common iliac is 1.4x1.2cm.  Spleen 12.1 cm with no abnormalities. Left kidney 12.4cm. Liver is increased echogenicity suggesting fatty infiltration vs chronic inflammation. Gallstones and negative rodriguez sign. Some ectasia of the maura arteries bilaterally. Prior: reading much more limited and they said it was limited due to gas.  So they did this addendum. We compared to the new reading Not sure why the differences on that report. Spoke with pt and reviewed the reports with him. Dr Gonzalez

## 2022-11-14 ENCOUNTER — LAB OUTSIDE AN ENCOUNTER (OUTPATIENT)
Dept: URBAN - METROPOLITAN AREA TELEHEALTH 2 | Facility: TELEHEALTH | Age: 37
End: 2022-11-14

## 2023-02-14 ENCOUNTER — LAB OUTSIDE AN ENCOUNTER (OUTPATIENT)
Dept: URBAN - METROPOLITAN AREA TELEHEALTH 2 | Facility: TELEHEALTH | Age: 38
End: 2023-02-14

## 2023-02-27 ENCOUNTER — LAB OUTSIDE AN ENCOUNTER (OUTPATIENT)
Dept: URBAN - METROPOLITAN AREA TELEHEALTH 2 | Facility: TELEHEALTH | Age: 38
End: 2023-02-27

## 2023-03-01 ENCOUNTER — TELEPHONE ENCOUNTER (OUTPATIENT)
Dept: URBAN - METROPOLITAN AREA CLINIC 86 | Facility: CLINIC | Age: 38
End: 2023-03-01

## 2023-03-02 ENCOUNTER — LAB OUTSIDE AN ENCOUNTER (OUTPATIENT)
Dept: URBAN - METROPOLITAN AREA CLINIC 86 | Facility: CLINIC | Age: 38
End: 2023-03-02

## 2023-03-02 ENCOUNTER — OFFICE VISIT (OUTPATIENT)
Dept: URBAN - METROPOLITAN AREA CLINIC 86 | Facility: CLINIC | Age: 38
End: 2023-03-02

## 2023-03-05 ENCOUNTER — TELEPHONE ENCOUNTER (OUTPATIENT)
Dept: URBAN - METROPOLITAN AREA CLINIC 92 | Facility: CLINIC | Age: 38
End: 2023-03-05

## 2023-03-05 NOTE — HPI-TODAY'S VISIT:
Cirilo Sabillon, March 2 [facility] from [place] ultrasound shows spleen normal at 12 cm and liver 14.8 to 14.9 cm with diffusely increased/coarsened echogenicity.  Portal vessels had normal directional flow and hepatic vessels had normal flow.  Spleen vessels also were patent. Gallbladder contained a round echogenic structure 0.6 x 0.7 x 0.7 cm that could be a polyp which had not significantly changed versus December 31, 2018 when allowing for differences in technique. Common bile duct was normal at 4 mm. Right kidney 12.3 cm and left kidney 13 cm.  Spleen was normal at 11.1 cm. They summarized that you had a stable gallbladder polyp which is not significant change versus 2018 but she had a diffusely echogenic and coarsened appearing liver which they felt could be related to hepatitis but as you know your hepatitis B  treatment so that could also be related to fat addition to liver or other process.  We will discuss this and course with you and consider doing MRI as a follow up for this at the next visit. Dr Gonzalez

## 2023-03-07 ENCOUNTER — TELEPHONE ENCOUNTER (OUTPATIENT)
Dept: URBAN - METROPOLITAN AREA CLINIC 92 | Facility: CLINIC | Age: 38
End: 2023-03-07

## 2023-03-07 LAB
A/G RATIO: 1.9
ALBUMIN: 4.8
ALKALINE PHOSPHATASE: 66
ALT (SGPT): 30
AMBIG ABBREV CMP14 DEFAULT: (no result)
AST (SGOT): 20
BASO (ABSOLUTE): 0.1
BASOS: 1
BILIRUBIN, TOTAL: 0.7
BUN/CREATININE RATIO: 16
BUN: 13
CALCIUM: 9.4
CARBON DIOXIDE, TOTAL: 25
CHLORIDE: 104
CREATININE: 0.83
EGFR: 116
EOS (ABSOLUTE): 0.4
EOS: 4
GLOBULIN, TOTAL: 2.5
GLUCOSE: 83
HBSAG CONFIRMATION: POSITIVE
HBSAG SCREEN: (no result)
HBV LOG10: (no result)
HEMATOCRIT: 47.2
HEMATOLOGY COMMENTS:: (no result)
HEMOGLOBIN: 15.1
HEPATITIS B QUANTITATION: (no result)
IMMATURE CELLS: (no result)
IMMATURE GRANS (ABS): 0
IMMATURE GRANULOCYTES: 1
LYMPHS (ABSOLUTE): 2.8
LYMPHS: 32
MCH: 29.1
MCHC: 32
MCV: 91
MONOCYTES(ABSOLUTE): 0.6
MONOCYTES: 7
NEUTROPHILS (ABSOLUTE): 4.9
NEUTROPHILS: 55
NRBC: (no result)
PLATELETS: 234
POTASSIUM: 4.7
PROTEIN, TOTAL: 7.3
RBC: 5.19
RDW: 12.2
SODIUM: 143
TEST INFORMATION:: (no result)
WBC: 8.8

## 2023-03-07 NOTE — HPI-TODAY'S VISIT:
Dear Marita Sabillon, March 2 labs show glucose of 83 which is normal BUN of 13 creatinine 0.83 sodium 143 potassium 4.7 albumin 4.8 bilirubin 0.7 alk phos 66 AST 20 and ALT 30.  Ideal ALT less than 35. Hep B DNA remains not detected. White blood cell count 8.8 hemoglobin 15.1 platelet count 234 MCV 91 and normal neutrophils and lymphocytes. Hep B surface antigen remains confirm indicated but positive.  Hopefully that will clear but again we will continue to monitor for that possibility. Dr. Gonzalez

## 2023-03-09 ENCOUNTER — WEB ENCOUNTER (OUTPATIENT)
Dept: URBAN - METROPOLITAN AREA CLINIC 86 | Facility: CLINIC | Age: 38
End: 2023-03-09

## 2023-03-09 ENCOUNTER — OFFICE VISIT (OUTPATIENT)
Dept: URBAN - METROPOLITAN AREA CLINIC 86 | Facility: CLINIC | Age: 38
End: 2023-03-09
Payer: COMMERCIAL

## 2023-03-09 VITALS
BODY MASS INDEX: 36.68 KG/M2 | SYSTOLIC BLOOD PRESSURE: 128 MMHG | HEIGHT: 68 IN | HEART RATE: 72 BPM | DIASTOLIC BLOOD PRESSURE: 82 MMHG | WEIGHT: 242 LBS | TEMPERATURE: 97.1 F

## 2023-03-09 DIAGNOSIS — K76.0 FATTY LIVER: ICD-10-CM

## 2023-03-09 DIAGNOSIS — Z79.899 ENCOUNTER FOR LONG-TERM (CURRENT) USE OF HIGH-RISK MEDICATION: ICD-10-CM

## 2023-03-09 DIAGNOSIS — K82.4 GALL BLADDER POLYP: ICD-10-CM

## 2023-03-09 DIAGNOSIS — R89.4 HEPATITIS A ANTIBODY POSITIVE: ICD-10-CM

## 2023-03-09 DIAGNOSIS — B18.1 CHRONIC HEPATITIS B: ICD-10-CM

## 2023-03-09 DIAGNOSIS — E66.09 OTHER OBESITY DUE TO EXCESS CALORIES: ICD-10-CM

## 2023-03-09 DIAGNOSIS — Z71.89 VACCINE COUNSELING: ICD-10-CM

## 2023-03-09 DIAGNOSIS — M54.30 SCIATIC LEG PAIN: ICD-10-CM

## 2023-03-09 DIAGNOSIS — E66.3 OVERWEIGHT: ICD-10-CM

## 2023-03-09 PROBLEM — 197321007 FATTY LIVER: Status: ACTIVE | Noted: 2022-09-02

## 2023-03-09 PROBLEM — 162864005: Status: ACTIVE | Noted: 2021-08-26

## 2023-03-09 PROBLEM — 23056005: Status: ACTIVE | Noted: 2021-08-26

## 2023-03-09 PROBLEM — 414916001: Status: ACTIVE | Noted: 2021-08-26

## 2023-03-09 PROCEDURE — 99214 OFFICE O/P EST MOD 30 MIN: CPT | Performed by: PHYSICIAN ASSISTANT

## 2023-03-09 RX ORDER — TENOFOVIR ALAFENAMIDE 25 MG/1
1 TABLET WITH FOOD TABLET ORAL ONCE A DAY
Qty: 30 TABLET | Refills: 5 | Status: ACTIVE | COMMUNITY

## 2023-03-09 RX ORDER — TENOFOVIR ALAFENAMIDE 25 MG/1
1 TABLET WITH FOOD TABLET ORAL ONCE A DAY
Qty: 30 TABLET | Refills: 5

## 2023-03-09 NOTE — HPI-TODAY'S VISIT:
This is a scheduled follow-up appointment for this patient, a 37 year old  male, after a previous visit in Sept 2022 with Dr. Mekhi Gonzalez, for an evaluation for hepatitis B and on tx response.  He had completed 6 years of treatment with viread. He has been now on vemlidy since spring 2017. The patient has been compliant with his treatment regimen.   3/9/23 March 2 AHI from [place] ultrasound shows spleen normal at 12 cm and liver 14.8 to 14.9 cm with diffusely increased/coarsened echogenicity.  Portal vessels had normal directional flow and hepatic vessels had normal flow.  Spleen vessels also were patent. Gallbladder contained a round echogenic structure 0.6 x 0.7 x 0.7 cm that could be a polyp which had not significantly changed versus December 31, 2018 when allowing for differences in technique. Common bile duct was normal at 4 mm. Right kidney 12.3 cm and left kidney 13 cm.  Spleen was normal at 11.1 cm. They summarized that you had a stable gallbladder polyp which is not significant change versus 2018 but she had a diffusely echogenic and coarsened appearing liver which they felt could be related to hepatitis but as you know your hepatitis B  treatment so that could also be related to fat addition to liver or other process.  We will discuss this and course with you and consider doing MRI as a follow up for this at the next visit.  March 2 labs show glucose of 83 which is normal BUN of 13 creatinine 0.83 sodium 143 potassium 4.7 albumin 4.8 bilirubin 0.7 alk phos 66 AST 20 and ALT 30.  Ideal ALT less than 35. Hep B DNA remains not detected. White blood cell count 8.8 hemoglobin 15.1 platelet count 234 MCV 91 and normal neutrophils and lymphocytes. Hep B surface antigen remains confirm indicated but positive.  Hopefully that will clear but again we will continue to monitor for that possibility.  He notes that  he is not sleeping well or eating healthy and this is a probelma and discussed this and possible sleep apnea as he admits to snoring and would recommend discussing with PCP . has gained 20 lbs as well and suspect this is the issues.   recap August 23 labs show glucose slightly up at 105 and possibly you were not fasting this day?  Please share with primary provider. BUN of 17 creatinine 0.84 sodium 141 potassium 4.2 calcium 9.2 albumin 4.5 bilirubin 0.5 alkaline phosphatase 62 AST 19 ALT 27.  Back in February her AST was 24 and ALT 34 so these are even lower.  Any changes of weight or medicines on to explain this?   He says that may have lost some weight. Working out. White blood cell count 9.1 hemoglobin 14.3 platelet count 235 MCV 93 and these are normal.  Your mean corpuscular hemoglobin concentration which was previously normal was slightly low this time at 31.4 and please share with primary provider. Neutrophils were normal at 5.3 lymphocytes 2.8.  Hep B surface antigen remains confirmed indicated and positive on the confirmation. Hep B DNA remains not detected.  As for u.s done last fri at [facility] in [place].  February 22 labs able to be tracked down.   White blood cell count 9 hemoglobin 13.5 platelet count 217 MCV 93 and neutrophils 5.7 and lymphocytes 2.1.  These are all normal range.  Glucose better at 99 BUN is 17 creatinine 0.87 sodium 139 potassium 4.6 chloride 103 CO2 of 22 albumin 4.7 bilirubin 0.6 alkaline phosphatase 64 AST 24 ALT 34.  Previously alk phos 58 AST 20 and ALT 30 so these labs are very close to that size but slightly higher.  Ideal ALT is still less than 35 so doing well.  Hep B surface antigen is positive. Have seen in many pts, that during winter some weight gain and decreased exercise can affect the liver labs. Hopefully better weather now will allow for more ability to do exercise and work on the weight to help this.  Last visit he thought had gained 10 pounds and he thinks he has lost some of it.   February 17 [place] AHI report in. Liver showed slightly increased echogenicity without definite focal lesion. Spleen was normal at 12.3 cm.  Liver vessels patent. Overall they felt the liver was fatty and that you had patent vessels.   Aug 20 2021 glu 109 and bun 23 and cr 0.83 and na 141 and k 4.4 and cl 105 and co2 22 and alb 4.7 and tb 0.4 and alk 58 and ast 20 and alt 30 and wbc 8.9 and hg 14 and mcv 93 and plat 253.  hbv pending.   August 12 ultrasound shows technically limited exam. Liver measured 15 cm and the spleen was approximately 13 x 12.6 x 5 cm.  Flow was seen in the portal and hepatic veins with normal physiologic direction. Hepatofugal flow was seen in the hepatic veins. Overall they did feel that the exam was technically difficult but that the patency of the vessels was seen and that the spleen was prominent at 13 cm.  He had sciatica and steroids shots x 2 for this pain prior and he says sciatica is off and on.  Feb 2021: u.s:  no focal hepatic abnormality. No liver lesions. Liver appears unchanged.  Gallbladder polyp 7mm without significant change from prior exam, Spleen normal in size. No kidney hydronephrosis seen. Pancreas obscured by gas. Common duct normal.   Jan 16 2020 liver mildly increased in echogenicity and liver wnl. Right lobe 14cm and no focal mass. Stable gb focus 7mm and no gallstones. cbd 4mm. No evidence of hydronephrosis. spleen 12.5cmx12.8x5.6cm. Stable gb polyp.                                          Shiv 15 2020 wbc 8.2 hg 14.1 and plat 239 and glu 99 and cr 0.79 and na 140 and k 4.5 and cl 103 and co2 21 and alb 4.6 and tb 0.4 and alk 58 and ast 22 and alt 30 hbv dna not detected.  Dec 4 2019 wbc 7.5 hg 14 and plat 225. glu 95 and cr 0.81 and na 140 and k 4.6 and cl 102 and co2 21 and alb 4.7 and tb 0.7 and 59 andast 21 and alt 27 hbv dna not detected. Dear Marita Sabillon,

## 2023-03-23 ENCOUNTER — ERX REFILL RESPONSE (OUTPATIENT)
Dept: URBAN - METROPOLITAN AREA CLINIC 86 | Facility: CLINIC | Age: 38
End: 2023-03-23

## 2023-03-23 RX ORDER — TENOFOVIR ALAFENAMIDE 25 MG/1
TAKE 1 TABLET BY MOUTH 1 TIME A DAY TABLET ORAL
Qty: 30 TABLET | Refills: 5 | OUTPATIENT

## 2023-03-23 RX ORDER — TENOFOVIR ALAFENAMIDE 25 MG/1
1 TABLET WITH FOOD TABLET ORAL ONCE A DAY
Qty: 30 TABLET | Refills: 5 | OUTPATIENT

## 2023-06-09 ENCOUNTER — LAB OUTSIDE AN ENCOUNTER (OUTPATIENT)
Dept: URBAN - METROPOLITAN AREA CLINIC 86 | Facility: CLINIC | Age: 38
End: 2023-06-09

## 2023-07-11 ENCOUNTER — TELEPHONE ENCOUNTER (OUTPATIENT)
Dept: URBAN - METROPOLITAN AREA CLINIC 86 | Facility: CLINIC | Age: 38
End: 2023-07-11

## 2023-07-11 LAB
A/G RATIO: 2
ALBUMIN: 4.7
ALKALINE PHOSPHATASE: 74
ALT (SGPT): 28
AST (SGOT): 20
BASO (ABSOLUTE): 0.1
BASOS: 1
BILIRUBIN, TOTAL: 0.5
BUN/CREATININE RATIO: 22
BUN: 17
CALCIUM: 9.4
CARBON DIOXIDE, TOTAL: 25
CHLORIDE: 103
CREATININE: 0.77
EGFR: 118
EOS (ABSOLUTE): 0.1
EOS: 2
GLOBULIN, TOTAL: 2.4
GLUCOSE: 94
HBSAG CONFIRMATION: POSITIVE
HBSAG SCREEN: (no result)
HBV LOG10: (no result)
HEMATOCRIT: 45.5
HEMATOLOGY COMMENTS:: (no result)
HEMOGLOBIN: 14.9
HEPATITIS B QUANTITATION: (no result)
IMMATURE CELLS: (no result)
IMMATURE GRANS (ABS): 0
IMMATURE GRANULOCYTES: 0
LYMPHS (ABSOLUTE): 2.7
LYMPHS: 33
MCH: 30
MCHC: 32.7
MCV: 92
MONOCYTES(ABSOLUTE): 0.6
MONOCYTES: 7
NEUTROPHILS (ABSOLUTE): 4.6
NEUTROPHILS: 57
NRBC: (no result)
PLATELETS: 235
POTASSIUM: 5
PROTEIN, TOTAL: 7.1
RBC: 4.96
RDW: 12.1
SODIUM: 141
TEST INFORMATION:: (no result)
WBC: 8.2

## 2023-07-11 NOTE — HPI-TODAY'S VISIT:
Dear Marita Sabillon,  The 7/5/23 labs were sent to me. The glucose 94, creatinine 0.77, sodium 141, potassium 5.0, bilirubin 0.5, alkaline phosphatase 74, AST 20, ALT 28.  Goal for the AST and ALT is less than 35.  Previously the AST 20 and ALT 37 ALT slightly lower this check.  The complete blood count showing the white blood cells red blood cells hemoglobin and platelets was normal.  White blood cells 8.2, red blood cells 4.96, hemoglobin 14.9, MCV 92, platelets 235.  The hepatitis B virus was not detected.  Surface antigen remains positive.  We will see what the ultrasound in August shows and I believe there is some labs to do around that time as well.  Please let us know if there are any issues.  [phone] extension [phone] for my medical assistant, Trinh.  Shelby Hood PA-C

## 2023-09-01 ENCOUNTER — LAB OUTSIDE AN ENCOUNTER (OUTPATIENT)
Dept: URBAN - METROPOLITAN AREA CLINIC 86 | Facility: CLINIC | Age: 38
End: 2023-09-01

## 2023-09-04 ENCOUNTER — LAB OUTSIDE AN ENCOUNTER (OUTPATIENT)
Dept: URBAN - METROPOLITAN AREA CLINIC 86 | Facility: CLINIC | Age: 38
End: 2023-09-04

## 2023-09-08 ENCOUNTER — LAB OUTSIDE AN ENCOUNTER (OUTPATIENT)
Dept: URBAN - METROPOLITAN AREA CLINIC 92 | Facility: CLINIC | Age: 38
End: 2023-09-08

## 2023-09-08 ENCOUNTER — OFFICE VISIT (OUTPATIENT)
Dept: URBAN - METROPOLITAN AREA TELEHEALTH 2 | Facility: TELEHEALTH | Age: 38
End: 2023-09-08
Payer: COMMERCIAL

## 2023-09-08 VITALS — WEIGHT: 225 LBS | HEIGHT: 68 IN | BODY MASS INDEX: 34.1 KG/M2

## 2023-09-08 DIAGNOSIS — B18.1 CHRONIC HEPATITIS B: ICD-10-CM

## 2023-09-08 DIAGNOSIS — K82.4 GALL BLADDER POLYP: ICD-10-CM

## 2023-09-08 DIAGNOSIS — E66.09 OTHER OBESITY DUE TO EXCESS CALORIES: ICD-10-CM

## 2023-09-08 DIAGNOSIS — K76.0 FATTY LIVER: ICD-10-CM

## 2023-09-08 PROCEDURE — 99214 OFFICE O/P EST MOD 30 MIN: CPT

## 2023-09-08 RX ORDER — TENOFOVIR ALAFENAMIDE 25 MG/1
TAKE 1 TABLET BY MOUTH 1 TIME A DAY TABLET ORAL
Qty: 30 TABLET | Refills: 5 | Status: ACTIVE | COMMUNITY

## 2023-09-08 RX ORDER — TENOFOVIR ALAFENAMIDE 25 MG/1
1 TABLET WITH FOOD TABLET ORAL ONCE A DAY
Qty: 30 TABLET | Refills: 5

## 2023-09-08 NOTE — HPI-TODAY'S VISIT:
Pt is a 37 year old  male, after a previous visit in March 2023 for an evaluation for hepatitis B and on tx response.  He had completed 6 years of treatment with viread. He has been now on vemlidy since spring 2017. The patient has been compliant with his treatment regimen.    7/5/23 labs were sent to me. The glucose 94, creatinine 0.77, sodium 141, potassium 5.0, bilirubin 0.5, alkaline phosphatase 74, AST 20, ALT 28.  Goal for the AST and ALT is less than 35.  Previously the AST 20 and ALT 37 ALT slightly lower this check.  The complete blood count showing the white blood cells red blood cells hemoglobin and platelets was normal.  White blood cells 8.2, red blood cells 4.96, hemoglobin 14.9, MCV 92, platelets 235.  The hepatitis B virus was not detected.  Surface antigen remains positive.  We will see what the ultrasound in August shows and I believe there is some labs to do around that time as well.  Please let us know if there are any issues.  He did the scan sept 21 u.s.He is doing [place].  3/9/23 March 2 AHI from [place] ultrasound shows spleen normal at 12 cm and liver 14.8 to 14.9 cm with diffusely increased/coarsened echogenicity.  Portal vessels had normal directional flow and hepatic vessels had normal flow.  Spleen vessels also were patent. Gallbladder contained a round echogenic structure 0.6 x 0.7 x 0.7 cm that could be a polyp which had not significantly changed versus December 31, 2018 when allowing for differences in technique. Common bile duct was normal at 4 mm. Right kidney 12.3 cm and left kidney 13 cm.  Spleen was normal at 11.1 cm. They summarized that you had a stable gallbladder polyp which is not significant change versus 2018 but she had a diffusely echogenic and coarsened appearing liver which they felt could be related to hepatitis but as you know your hepatitis B  treatment so that could also be related to fat addition to liver or other process.  We will discuss this and course with you and consider doing MRI as a follow up for this at the next visit.  March 2 labs show glucose of 83 which is normal BUN of 13 creatinine 0.83 sodium 143 potassium 4.7 albumin 4.8 bilirubin 0.7 alk phos 66 AST 20 and ALT 30.  Ideal ALT less than 35. Hep B DNA remains not detected. White blood cell count 8.8 hemoglobin 15.1 platelet count 234 MCV 91 and normal neutrophils and lymphocytes. Hep B surface antigen remains confirm indicated but positive.  Hopefully that will clear but again we will continue to monitor for that possibility.  Prior had some trouble sleeping and not an issue.  August 23 labs show glucose slightly up at 105 and possibly you were not fasting this day?  Please share with primary provider. BUN of 17 creatinine 0.84 sodium 141 potassium 4.2 calcium 9.2 albumin 4.5 bilirubin 0.5 alkaline phosphatase 62 AST 19 ALT 27.  Back in February her AST was 24 and ALT 34 so these are even lower.  Any changes of weight or medicines on to explain this?   He says that may have lost some weight. Working out. White blood cell count 9.1 hemoglobin 14.3 platelet count 235 MCV 93 and these are normal.  Your mean corpuscular hemoglobin concentration which was previously normal was slightly low this time at 31.4 and please share with primary provider. Neutrophils were normal at 5.3 lymphocytes 2.8.  Hep B surface antigen remains confirmed indicated and positive on the confirmation. Hep B DNA remains not detected.   February 22 labs able to be tracked down.   White blood cell count 9 hemoglobin 13.5 platelet count 217 MCV 93 and neutrophils 5.7 and lymphocytes 2.1.  These are all normal range.  Glucose better at 99 BUN is 17 creatinine 0.87 sodium 139 potassium 4.6 chloride 103 CO2 of 22 albumin 4.7 bilirubin 0.6 alkaline phosphatase 64 AST 24 ALT 34.  Previously alk phos 58 AST 20 and ALT 30 so these labs are very close to that size but slightly higher.  Ideal ALT is still less than 35 so doing well.  Hep B surface antigen is positive. Have seen in many pts, that during winter some weight gain and decreased exercise can affect the liver labs. Hopefully better weather now will allow for more ability to do exercise and work on the weight to help this.  Last visit he thought had gained 10 pounds and he thinks he has lost some of it.   February 17 [place] AHI report in. Liver showed slightly increased echogenicity without definite focal lesion. Spleen was normal at 12.3 cm.  Liver vessels patent. Overall they felt the liver was fatty and that you had patent vessels.   Aug 20 2021 glu 109 and bun 23 and cr 0.83 and na 141 and k 4.4 and cl 105 and co2 22 and alb 4.7 and tb 0.4 and alk 58 and ast 20 and alt 30 and wbc 8.9 and hg 14 and mcv 93 and plat 253.  hbv pending.   August 12 ultrasound shows technically limited exam. Liver measured 15 cm and the spleen was approximately 13 x 12.6 x 5 cm.  Flow was seen in the portal and hepatic veins with normal physiologic direction. Hepatofugal flow was seen in the hepatic veins. Overall they did feel that the exam was technically difficult but that the patency of the vessels was seen and that the spleen was prominent at 13 cm.  He had sciatica and steroids shots x 2 for this pain prior and he says sciatica is off and on.  Feb 2021: u.s:  no focal hepatic abnormality. No liver lesions. Liver appears unchanged.  Gallbladder polyp 7mm without significant change from prior exam, Spleen normal in size. No kidney hydronephrosis seen. Pancreas obscured by gas. Common duct normal.   Jan 16 2020 liver mildly increased in echogenicity and liver wnl. Right lobe 14cm and no focal mass. Stable gb focus 7mm and no gallstones. cbd 4mm. No evidence of hydronephrosis. spleen 12.5cmx12.8x5.6cm. Stable gb polyp.                                          Shiv 15 2020 wbc 8.2 hg 14.1 and plat 239 and glu 99 and cr 0.79 and na 140 and k 4.5 and cl 103 and co2 21 and alb 4.6 and tb 0.4 and alk 58 and ast 22 and alt 30 hbv dna not detected.  Dec 4 2019 wbc 7.5 hg 14 and plat 225. glu 95 and cr 0.81 and na 140 and k 4.6 and cl 102 and co2 21 and alb 4.7 and tb 0.7 and 59 andast 21 and alt 27 hbv dna not detected.   Plan: 1. Labs in oct and jan, 2. U.s soon and call and let us know when he does it. 3. Plan for the u.s in 6m. 4, Stay on the vemlidy.   Duration of the visit was 31 minutes with 10 minutes of chart prep and 21 minutes by clock as [facility] clock off  from 101 pm to 122 pm for the [facility] teleMed visit reviewing recent records, discussing their current status and the future plans for the patient.

## 2023-09-24 LAB
A/G RATIO: 1.9
ALBUMIN: 4.7
ALKALINE PHOSPHATASE: 72
ALT (SGPT): 37
AST (SGOT): 25
BASO (ABSOLUTE): 0.1
BASOS: 1
BILIRUBIN, TOTAL: 0.7
BUN/CREATININE RATIO: 22
BUN: 19
CALCIUM: 9.5
CARBON DIOXIDE, TOTAL: 24
CHLORIDE: 102
CREATININE: 0.85
EGFR: 115
EOS (ABSOLUTE): 0.2
EOS: 3
GLOBULIN, TOTAL: 2.5
GLUCOSE: 93
HBSAG CONFIRMATION: POSITIVE
HBSAG SCREEN: (no result)
HBV LOG10: (no result)
HEMATOCRIT: 44.7
HEMATOLOGY COMMENTS:: (no result)
HEMOGLOBIN: 14.6
HEPATITIS B QUANTITATION: (no result)
IMMATURE CELLS: (no result)
IMMATURE GRANS (ABS): 0
IMMATURE GRANULOCYTES: 1
LYMPHS (ABSOLUTE): 2.7
LYMPHS: 33
MCH: 30.2
MCHC: 32.7
MCV: 93
MONOCYTES(ABSOLUTE): 0.7
MONOCYTES: 8
NEUTROPHILS (ABSOLUTE): 4.5
NEUTROPHILS: 54
NRBC: (no result)
PLATELETS: 204
POTASSIUM: 4.3
PROTEIN, TOTAL: 7.2
RBC: 4.83
RDW: 12.5
SODIUM: 139
TEST INFORMATION:: (no result)
WBC: 8.2

## 2023-09-28 ENCOUNTER — TELEPHONE ENCOUNTER (OUTPATIENT)
Dept: URBAN - METROPOLITAN AREA CLINIC 86 | Facility: CLINIC | Age: 38
End: 2023-09-28

## 2023-09-28 NOTE — HPI-TODAY'S VISIT:
Dear Marita Sabillon,   The 9/21/23 ahi ultrasound was sent to me.  Was normal and size but they did see increased echogenicity in keeping with fatty liver.  No focal lesion.  The hepatic vasculature patent.  They felt the gallbladder was normal in caliber and they did not see any gallstones.  They saw a stable polyp that was 6 x 6 x 7 mm.  They did not see any intra or extrahepatic biliary ductal dilatation.  The right kidney and left kidney both appeared normal.  Overall they saw fatty liver and need to continue to work on this with diet, exercise, and weight loss.  They noted that the gallbladder polyp has been stable for 5 years which is good to see. We will review at the follow up.  Shelby Hood PA-C

## 2023-09-28 NOTE — HPI-TODAY'S VISIT:
Dear Marita Sabillon,  The recent 9/28/23 labs were sent to me. Creatinine 0.85, sodium 139, potassium 4.3, bilirubin 0.7, alkaline phosphatase 72, AST 25, ALT 37.  Goal for the AST and ALT is less than 35.  The hepatitis B viral load was not detected.  Complete blood count showing white blood cells 8.2, red blood cells 4.8, hemoglobin 14.6, MCV 93, platelets 204.  These are normal.  Surface antigen remains positive.  If you recall the ultrasound was still showing fatty liver and suspected this is the reason the ALT is slightly above goal.  Be sure to work on the diet, exercise, and weight loss and we will review this at the follow-up.  Shelby Hood PA-C

## 2023-10-23 ENCOUNTER — ERX REFILL RESPONSE (OUTPATIENT)
Dept: URBAN - METROPOLITAN AREA CLINIC 86 | Facility: CLINIC | Age: 38
End: 2023-10-23

## 2023-10-23 RX ORDER — TENOFOVIR ALAFENAMIDE 25 MG/1
1 TABLET WITH FOOD TABLET ORAL ONCE A DAY
Qty: 30 TABLET | Refills: 5 | OUTPATIENT

## 2023-12-01 ENCOUNTER — LAB OUTSIDE AN ENCOUNTER (OUTPATIENT)
Dept: URBAN - METROPOLITAN AREA TELEHEALTH 2 | Facility: TELEHEALTH | Age: 38
End: 2023-12-01

## 2024-01-01 ENCOUNTER — LAB OUTSIDE AN ENCOUNTER (OUTPATIENT)
Dept: URBAN - METROPOLITAN AREA TELEHEALTH 2 | Facility: TELEHEALTH | Age: 39
End: 2024-01-01

## 2024-03-01 ENCOUNTER — LAB (OUTPATIENT)
Dept: URBAN - METROPOLITAN AREA TELEHEALTH 2 | Facility: TELEHEALTH | Age: 39
End: 2024-03-01

## 2024-03-10 LAB
A/G RATIO: 1.6
ALBUMIN: 4.5
ALKALINE PHOSPHATASE: 67
ALT (SGPT): 36
AST (SGOT): 23
BASO (ABSOLUTE): 0.1
BASOS: 1
BILIRUBIN, TOTAL: 0.7
BUN/CREATININE RATIO: 23
BUN: 18
CALCIUM: 9.3
CARBON DIOXIDE, TOTAL: 23
CHLORIDE: 103
CREATININE: 0.8
EGFR: 116
EOS (ABSOLUTE): 0.3
EOS: 4
GLOBULIN, TOTAL: 2.9
GLUCOSE: 96
HBV LOG10: 1
HEMATOCRIT: 44.8
HEMATOLOGY COMMENTS:: (no result)
HEMOGLOBIN: 14.8
HEPATITIS B QUANTITATION: 10
IMMATURE CELLS: (no result)
IMMATURE GRANS (ABS): 0
IMMATURE GRANULOCYTES: 0
LYMPHS (ABSOLUTE): 2.5
LYMPHS: 29
MCH: 30
MCHC: 33
MCV: 91
MONOCYTES(ABSOLUTE): 0.5
MONOCYTES: 6
NEUTROPHILS (ABSOLUTE): 5.1
NEUTROPHILS: 60
NRBC: (no result)
PLATELETS: 246
POTASSIUM: 4.5
PROTEIN, TOTAL: 7.4
RBC: 4.94
RDW: 12.4
SODIUM: 140
TEST INFORMATION:: (no result)
WBC: 8.5

## 2024-03-11 ENCOUNTER — OV EP (OUTPATIENT)
Dept: URBAN - METROPOLITAN AREA CLINIC 86 | Facility: CLINIC | Age: 39
End: 2024-03-11

## 2024-03-11 ENCOUNTER — TELEP (OUTPATIENT)
Dept: URBAN - METROPOLITAN AREA TELEHEALTH 2 | Facility: TELEHEALTH | Age: 39
End: 2024-03-11
Payer: COMMERCIAL

## 2024-03-11 VITALS — WEIGHT: 225 LBS | BODY MASS INDEX: 34.1 KG/M2 | HEIGHT: 68 IN

## 2024-03-11 DIAGNOSIS — B18.1 CHRONIC HEPATITIS B: ICD-10-CM

## 2024-03-11 DIAGNOSIS — R89.4 HEPATITIS A ANTIBODY POSITIVE: ICD-10-CM

## 2024-03-11 DIAGNOSIS — K82.4 GALL BLADDER POLYP: ICD-10-CM

## 2024-03-11 DIAGNOSIS — K76.0 FATTY LIVER: ICD-10-CM

## 2024-03-11 PROCEDURE — 99214 OFFICE O/P EST MOD 30 MIN: CPT

## 2024-03-11 RX ORDER — TENOFOVIR ALAFENAMIDE 25 MG/1
1 TABLET WITH FOOD TABLET ORAL ONCE A DAY
Qty: 30 TABLET | Refills: 5

## 2024-03-11 RX ORDER — TENOFOVIR ALAFENAMIDE 25 MG/1
1 TABLET WITH FOOD TABLET ORAL ONCE A DAY
Qty: 30 TABLET | Refills: 5 | Status: ACTIVE | COMMUNITY

## 2024-03-11 NOTE — HPI-TODAY'S VISIT:
Pt is a 38 year old  male, after a previous visit in Sept 2023 for an evaluation for hepatitis B and on tx response.  He had completed 6 years of treatment with viread. He has been now on vemlidy since spring 2017. The patient has been compliant with his treatment regimen.  Cirilo Sabillon,  The recent 9/28/23 labs were sent to me. Creatinine 0.85, sodium 139, potassium 4.3, bilirubin 0.7, alkaline phosphatase 72, AST 25, ALT 37. Goal for the AST and ALT is less than 35. The hepatitis B viral load was not detected. Complete blood count showing white blood cells 8.2, red blood cells 4.8, hemoglobin 14.6, MCV 93, platelets 204. These are normal. Surface antigen remains positive. If you recall the ultrasound was still showing fatty liver and suspected this is the reason the ALT is slightly above goal. Be sure to work on the diet, exercise, and weight loss and we will review this at the follow-up.  YURIY Yang,  The 9/21/23 ahi ultrasound was sent to me. Was normal and size but they did see increased echogenicity in keeping with fatty liver. No focal lesion. The hepatic vasculature patent. They felt the gallbladder was normal in caliber and they did not see any gallstones. They saw a stable polyp that was 6 x 6 x 7 mm. They did not see any intra or extrahepatic biliary ductal dilatation. The right kidney and left kidney both appeared normal. Overall they saw fatty liver and need to continue to work on this with diet, exercise, and weight loss. They noted that the gallbladder polyp has been stable for 5 years which is good to see. We will review at the follow up.  Shelby Hood PA-C  7/5/23 labs were sent to me. The glucose 94, creatinine 0.77, sodium 141, potassium 5.0, bilirubin 0.5, alkaline phosphatase 74, AST 20, ALT 28. Goal for the AST and ALT is less than 35. Previously the AST 20 and ALT 37 ALT slightly lower this check. The complete blood count showing the white blood cells red blood cells hemoglobin and platelets was normal. White blood cells 8.2, red blood cells 4.96, hemoglobin 14.9, MCV 92, platelets 235. The hepatitis B virus was not detected. Surface antigen remains positive. We will see what the ultrasound in August shows and I believe there is some labs to do around that time as well. Please let us know if there are any issues.  He did the scan sept 21 u.s.He is doing [place].  3/9/23 March 2 AHI from [place] ultrasound shows spleen normal at 12 cm and liver 14.8 to 14.9 cm with diffusely increased/coarsened echogenicity. Portal vessels had normal directional flow and hepatic vessels had normal flow. Spleen vessels also were patent. Gallbladder contained a round echogenic structure 0.6 x 0.7 x 0.7 cm that could be a polyp which had not significantly changed versus December 31, 2018 when allowing for differences in technique. Common bile duct was normal at 4 mm. Right kidney 12.3 cm and left kidney 13 cm. Spleen was normal at 11.1 cm. They summarized that you had a stable gallbladder polyp which is not significant change versus 2018 but she had a diffusely echogenic and coarsened appearing liver which they felt could be related to hepatitis but as you know your hepatitis B treatment so that could also be related to fat addition to liver or other process. We will discuss this and course with you and consider doing MRI as a follow up for this at the next visit.  March 2 labs show glucose of 83 which is normal BUN of 13 creatinine 0.83 sodium 143 potassium 4.7 albumin 4.8 bilirubin 0.7 alk phos 66 AST 20 and ALT 30. Ideal ALT less than 35. Hep B DNA remains not detected. White blood cell count 8.8 hemoglobin 15.1 platelet count 234 MCV 91 and normal neutrophils and lymphocytes. Hep B surface antigen remains confirm indicated but positive. Hopefully that will clear but again we will continue to monitor for that possibility.  Prior had some trouble sleeping and not an issue.  August 23 labs show glucose slightly up at 105 and possibly you were not fasting this day? Please share with primary provider. BUN of 17 creatinine 0.84 sodium 141 potassium 4.2 calcium 9.2 albumin 4.5 bilirubin 0.5 alkaline phosphatase 62 AST 19 ALT 27. Back in February her AST was 24 and ALT 34 so these are even lower. Any changes of weight or medicines on to explain this? He says that may have lost some weight. Working out. White blood cell count 9.1 hemoglobin 14.3 platelet count 235 MCV 93 and these are normal. Your mean corpuscular hemoglobin concentration which was previously normal was slightly low this time at 31.4 and please share with primary provider. Neutrophils were normal at 5.3 lymphocytes 2.8. Hep B surface antigen remains confirmed indicated and positive on the confirmation. Hep B DNA remains not detected.   February 22 labs able to be tracked down. White blood cell count 9 hemoglobin 13.5 platelet count 217 MCV 93 and neutrophils 5.7 and lymphocytes 2.1. These are all normal range. Glucose better at 99 BUN is 17 creatinine 0.87 sodium 139 potassium 4.6 chloride 103 CO2 of 22 albumin 4.7 bilirubin 0.6 alkaline phosphatase 64 AST 24 ALT 34. Previously alk phos 58 AST 20 and ALT 30 so these labs are very close to that size but slightly higher. Ideal ALT is still less than 35 so doing well. Hep B surface antigen is positive. Have seen in many pts, that during winter some weight gain and decreased exercise can affect the liver labs. Hopefully better weather now will allow for more ability to do exercise and work on the weight to help this.  Last visit he thought had gained 10 pounds and he thinks he has lost some of it.  February 17 [place] AHI report in. Liver showed slightly increased echogenicity without definite focal lesion. Spleen was normal at 12.3 cm. Liver vessels patent. Overall they felt the liver was fatty and that you had patent vessels.  Aug 20 2021 glu 109 and bun 23 and cr 0.83 and na 141 and k 4.4 and cl 105 and co2 22 and alb 4.7 and tb 0.4 and alk 58 and ast 20 and alt 30 and wbc 8.9 and hg 14 and mcv 93 and plat 253. hbv pending.  August 12 ultrasound shows technically limited exam. Liver measured 15 cm and the spleen was approximately 13 x 12.6 x 5 cm. Flow was seen in the portal and hepatic veins with normal physiologic direction. Hepatofugal flow was seen in the hepatic veins. Overall they did feel that the exam was technically difficult but that the patency of the vessels was seen and that the spleen was prominent at 13 cm.  He had sciatica and steroids shots x 2 for this pain prior and he says sciatica is off and on.  Feb 2021: u.s: no focal hepatic abnormality. No liver lesions. Liver appears unchanged. Gallbladder polyp 7mm without significant change from prior exam, Spleen normal in size. No kidney hydronephrosis seen. Pancreas obscured by gas. Common duct normal.  Jan 16 2020 liver mildly increased in echogenicity and liver wnl. Right lobe 14cm and no focal mass. Stable gb focus 7mm and no gallstones. cbd 4mm. No evidence of hydronephrosis. spleen 12.5cmx12.8x5.6cm. Stable gb polyp.  Shiv 15 2020 wbc 8.2 hg 14.1 and plat 239 and glu 99 and cr 0.79 and na 140 and k 4.5 and cl 103 and co2 21 and alb 4.6 and tb 0.4 and alk 58 and ast 22 and alt 30 hbv dna not detected.  Dec 4 2019 wbc 7.5 hg 14 and plat 225. glu 95 and cr 0.81 and na 140 and k 4.6 and cl 102 and co2 21 and alb 4.7 and tb 0.7 and 59 andast 21 and alt 27 hbv dna not detected.  Plan: 1. Labs in oct and jan, 2. U.s soon and call and let us know when he does it. 3. Plan for the u.s in 6m. 4, Stay on the vemlidy.   Duration of the visit was minutes with 10 minutes of chart prep and 21 minutes by clock as [facility] clock off from 101 pm to 122 pm for the [facility] teleMed visit reviewing recent records, discussing their current status and the future plans for the patient.

## 2024-03-11 NOTE — HPI-TODAY'S VISIT:
Pt is a 38 year old  male, after a previous visit in Sept 2023 for an evaluation for hepatitis B and on tx response.  He had completed 6 years of treatment with viread. He has been now on vemlidy since spring 2017. The patient has been compliant with his treatment regimen.  [facility] u.s done and labs also. Bárbara calling for this.   9/28/23 labs were sent to me. Creatinine 0.85, sodium 139, potassium 4.3, bilirubin 0.7, alkaline phosphatase 72, AST 25, ALT 37. Goal for the AST and ALT is less than 35. The hepatitis B viral load was not detected. Complete blood count showing white blood cells 8.2, red blood cells 4.8, hemoglobin 14.6, MCV 93, platelets 204. These are normal. Surface antigen remains positive. If you recall the ultrasound was still showing fatty liver and suspected this is the reason the ALT is slightly above goal. Be sure to work on the diet, exercise, and weight loss and we will review this at the follow-up.  9/21/23 [facility] ultrasound was sent to me. Was normal and size but they did see increased echogenicity in keeping with fatty liver. No focal lesion. The hepatic vasculature patent. They felt the gallbladder was normal in caliber and they did not see any gallstones. They saw a stable polyp that was 6 x 6 x 7 mm. They did not see any intra or extrahepatic biliary ductal dilatation. The right kidney and left kidney both appeared normal. Overall they saw fatty liver and need to continue to work on this with diet, exercise, and weight loss. They noted that the gallbladder polyp has been stable for 5 years which is good to see. We will review at the follow up.   7/5/23 labs were sent to me. The glucose 94, creatinine 0.77, sodium 141, potassium 5.0, bilirubin 0.5, alkaline phosphatase 74, AST 20, ALT 28. Goal for the AST and ALT is less than 35. Previously the AST 20 and ALT 37 ALT slightly lower this check. The complete blood count showing the white blood cells red blood cells hemoglobin and platelets was normal. White blood cells 8.2, red blood cells 4.96, hemoglobin 14.9, MCV 92, platelets 235. The hepatitis B virus was not detected. Surface antigen remains positive. We will see what the ultrasound in August shows and I believe there is some labs to do around that time as well. Please let us know if there are any issues.    3/9/23 March 2 [facility] from [place] ultrasound shows spleen normal at 12 cm and liver 14.8 to 14.9 cm with diffusely increased/coarsened echogenicity. Portal vessels had normal directional flow and hepatic vessels had normal flow. Spleen vessels also were patent. Gallbladder contained a round echogenic structure 0.6 x 0.7 x 0.7 cm that could be a polyp which had not significantly changed versus December 31, 2018 when allowing for differences in technique. Common bile duct was normal at 4 mm. Right kidney 12.3 cm and left kidney 13 cm. Spleen was normal at 11.1 cm. They summarized that you had a stable gallbladder polyp which is not significant change versus 2018 but she had a diffusely echogenic and coarsened appearing liver which they felt could be related to hepatitis but as you know your hepatitis B treatment so that could also be related to fat addition to liver or other process. We will discuss this and course with you and consider doing MRI as a follow up for this at the next visit.  March 2 labs show glucose of 83 which is normal BUN of 13 creatinine 0.83 sodium 143 potassium 4.7 albumin 4.8 bilirubin 0.7 alk phos 66 AST 20 and ALT 30. Ideal ALT less than 35. Hep B DNA remains not detected. White blood cell count 8.8 hemoglobin 15.1 platelet count 234 MCV 91 and normal neutrophils and lymphocytes. Hep B surface antigen remains confirm indicated but positive. Hopefully that will clear but again we will continue to monitor for that possibility.  Prior had some trouble sleeping and not an issue.  August 23 labs show glucose slightly up at 105 and possibly you were not fasting this day? Please share with primary provider. BUN of 17 creatinine 0.84 sodium 141 potassium 4.2 calcium 9.2 albumin 4.5 bilirubin 0.5 alkaline phosphatase 62 AST 19 ALT 27. Back in February her AST was 24 and ALT 34 so these are even lower. Any changes of weight or medicines on to explain this? He says that may have lost some weight. Working out. White blood cell count 9.1 hemoglobin 14.3 platelet count 235 MCV 93 and these are normal. Your mean corpuscular hemoglobin concentration which was previously normal was slightly low this time at 31.4 and please share with primary provider. Neutrophils were normal at 5.3 lymphocytes 2.8. Hep B surface antigen remains confirmed indicated and positive on the confirmation. Hep B DNA remains not detected.   February 22 labs able to be tracked down. White blood cell count 9 hemoglobin 13.5 platelet count 217 MCV 93 and neutrophils 5.7 and lymphocytes 2.1. These are all normal range. Glucose better at 99 BUN is 17 creatinine 0.87 sodium 139 potassium 4.6 chloride 103 CO2 of 22 albumin 4.7 bilirubin 0.6 alkaline phosphatase 64 AST 24 ALT 34. Previously alk phos 58 AST 20 and ALT 30 so these labs are very close to that size but slightly higher. Ideal ALT is still less than 35 so doing well. Hep B surface antigen is positive. Have seen in many pts, that during winter some weight gain and decreased exercise can affect the liver labs. Hopefully better weather now will allow for more ability to do exercise and work on the weight to help this.  Last visit he thought had gained 10 pounds and he thinks he has lost some of it.  February 17 [place] [facility] report in. Liver showed slightly increased echogenicity without definite focal lesion. Spleen was normal at 12.3 cm. Liver vessels patent. Overall they felt the liver was fatty and that you had patent vessels.  Aug 20 2021 glu 109 and bun 23 and cr 0.83 and na 141 and k 4.4 and cl 105 and co2 22 and alb 4.7 and tb 0.4 and alk 58 and ast 20 and alt 30 and wbc 8.9 and hg 14 and mcv 93 and plat 253. hbv pending.  August 12 ultrasound shows technically limited exam. Liver measured 15 cm and the spleen was approximately 13 x 12.6 x 5 cm. Flow was seen in the portal and hepatic veins with normal physiologic direction. Hepatofugal flow was seen in the hepatic veins. Overall they did feel that the exam was technically difficult but that the patency of the vessels was seen and that the spleen was prominent at 13 cm.  He had sciatica and steroids shots x 2 for this pain prior and he says sciatica is off and on.  Feb 2021: u.s: no focal hepatic abnormality. No liver lesions. Liver appears unchanged. Gallbladder polyp 7mm without significant change from prior exam, Spleen normal in size. No kidney hydronephrosis seen. Pancreas obscured by gas. Common duct normal.  Jan 16 2020 liver mildly increased in echogenicity and liver wnl. Right lobe 14cm and no focal mass. Stable gb focus 7mm and no gallstones. cbd 4mm. No evidence of hydronephrosis. spleen 12.5cmx12.8x5.6cm. Stable gb polyp.  Shiv 15 2020 wbc 8.2 hg 14.1 and plat 239 and glu 99 and cr 0.79 and na 140 and k 4.5 and cl 103 and co2 21 and alb 4.6 and tb 0.4 and alk 58 and ast 22 and alt 30 hbv dna not detected.  Dec 4 2019 wbc 7.5 hg 14 and plat 225. glu 95 and cr 0.81 and na 140 and k 4.6 and cl 102 and co2 21 and alb 4.7 and tb 0.7 and 59 andast 21 and alt 27 hbv dna not detected.  Plan: 1. Bárbara is calling for the labs and the u.s. 2. He is on the vemlidy. 3. Pt will plan for labs in 3m and 6m.  4. Pt will stay on vemlidy.  Duration of the visit was 30 minutes with 10 minutes of chart prep and 20 minutes for the [facility] teleMed visit reviewing recent records, discussing their current status and the future plans for the patient.

## 2024-06-01 ENCOUNTER — LAB OUTSIDE AN ENCOUNTER (OUTPATIENT)
Dept: URBAN - METROPOLITAN AREA TELEHEALTH 2 | Facility: TELEHEALTH | Age: 39
End: 2024-06-01

## 2024-06-07 ENCOUNTER — LAB OUTSIDE AN ENCOUNTER (OUTPATIENT)
Dept: URBAN - METROPOLITAN AREA CLINIC 86 | Facility: CLINIC | Age: 39
End: 2024-06-07

## 2024-06-11 ENCOUNTER — TELEPHONE ENCOUNTER (OUTPATIENT)
Dept: URBAN - METROPOLITAN AREA CLINIC 86 | Facility: CLINIC | Age: 39
End: 2024-06-11

## 2024-06-11 ENCOUNTER — OFFICE VISIT (OUTPATIENT)
Dept: URBAN - METROPOLITAN AREA TELEHEALTH 2 | Facility: TELEHEALTH | Age: 39
End: 2024-06-11
Payer: COMMERCIAL

## 2024-06-11 ENCOUNTER — DASHBOARD ENCOUNTERS (OUTPATIENT)
Age: 39
End: 2024-06-11

## 2024-06-11 VITALS — HEIGHT: 68 IN | WEIGHT: 225 LBS | BODY MASS INDEX: 34.1 KG/M2

## 2024-06-11 DIAGNOSIS — E66.3 OVERWEIGHT: ICD-10-CM

## 2024-06-11 DIAGNOSIS — K82.4 GALL BLADDER POLYP: ICD-10-CM

## 2024-06-11 DIAGNOSIS — B18.1 CHRONIC HEPATITIS B: ICD-10-CM

## 2024-06-11 DIAGNOSIS — K76.0 FATTY LIVER: ICD-10-CM

## 2024-06-11 LAB
A/G RATIO: 1.7
ALBUMIN: 4.4
ALKALINE PHOSPHATASE: 70
ALT (SGPT): 30
AST (SGOT): 25
BASO (ABSOLUTE): 0.1
BASOS: 1
BILIRUBIN, TOTAL: 0.6
BUN/CREATININE RATIO: 20
BUN: 15
CALCIUM: 8.9
CARBON DIOXIDE, TOTAL: 25
CHLORIDE: 103
CREATININE: 0.76
EGFR: 118
EOS (ABSOLUTE): 0.3
EOS: 4
GLOBULIN, TOTAL: 2.6
GLUCOSE: 93
HBV LOG10: (no result)
HEMATOCRIT: 44.7
HEMATOLOGY COMMENTS:: (no result)
HEMOGLOBIN: 14.8
HEPATITIS B QUANTITATION: (no result)
IMMATURE CELLS: (no result)
IMMATURE GRANS (ABS): 0
IMMATURE GRANULOCYTES: 0
LYMPHS (ABSOLUTE): 2.2
LYMPHS: 30
MCH: 30.3
MCHC: 33.1
MCV: 92
MONOCYTES(ABSOLUTE): 0.6
MONOCYTES: 9
NEUTROPHILS (ABSOLUTE): 4.1
NEUTROPHILS: 56
NRBC: (no result)
PLATELETS: 217
POTASSIUM: 4.5
PROTEIN, TOTAL: 7
RBC: 4.88
RDW: 12.2
SODIUM: 138
TEST INFORMATION:: (no result)
WBC: 7.2

## 2024-06-11 PROCEDURE — 99214 OFFICE O/P EST MOD 30 MIN: CPT

## 2024-06-11 RX ORDER — TENOFOVIR ALAFENAMIDE 25 MG/1
1 TABLET WITH FOOD TABLET ORAL ONCE A DAY
Qty: 30 TABLET | Refills: 5 | Status: ACTIVE | COMMUNITY

## 2024-06-11 RX ORDER — TENOFOVIR ALAFENAMIDE 25 MG/1
1 TABLET WITH FOOD TABLET ORAL ONCE A DAY
Qty: 30 TABLET | Refills: 5

## 2024-06-11 NOTE — HPI-TODAY'S VISIT:
Dear Marita Sabillon,   June 7 labs show glucose 93 BUN of 15 creatinine 0.76 with a creatinine actually lower than 0.8 in March and 0.85 on September and shows once again vemlidy has less renal issues than generic viread.  Sodium 138 potassium 4.5 calcium 8.9 albumin 4.4 bilirubin 0.6 alk phos 70 AST 25 and ALT 39 good to see the ALT be even lower. Hep B DNA back to being not detected as it was previously 10 back in March and prior to that had been undetectable. WBC 7.2 hemoglobin 14.8 platelet count 217 MCV 92 with neutrophils 4.1 and lymphocytes 2.2. Very happy to see that your labs are stable for you.  Please share with local providers.  Please redo labs in 3 and 6 months for us. Dr. Gonzalez

## 2024-06-11 NOTE — HPI-TODAY'S VISIT:
Pt is a 38 year old  male, after a previous visit in March 2023 for an evaluation for hepatitis B and on tx response.  He had completed 6 years of treatment with viread. He has been now on vemlidy since spring 2017. The patient has been compliant with his treatment regimen.  last labs done friday.  March 6 u.s showed coarse appearance and increased echogenicity of the echotexture of the liver and no focal lesions and max diameter of 14.8cm inmid clavicular line and normal intrahepatic IVC. Hepatopedal flow in the PV and gb stable 8 mm polyp and 2mm gb wall thickness. 4mm cbd. aorta 1.9cm and pancreas unremarkable and tail not seen due to gas right kidney 12.4cmand left 12cm and spleen 12.5cm. liver coarse and stable gb polyp.  March 6 labs show HBV dna 10 and prior not detected x 2 times. Did your meds lapse? Did merlyn miss a few days? No real reason. Glucose 96 BUN of 18 creatinine 0.89. Stable. Previously creatinine 0.85's were actually came down a little. Sodium 140 potassium 4.5 calcium 9.3 albumin 4.5 bilirubin 0.7 alk phos 67 which is lower AST of 23 and ALT 36 which are both slightly lower than last time. WBC 8.5, hemoglobin 14.8 and platelet count 246 MCV 91. Neutrophils and lymphocytes normal.   9/28/23 labs were sent to me. Creatinine 0.85, sodium 139, potassium 4.3, bilirubin 0.7, alkaline phosphatase 72, AST 25, ALT 37. Goal for the AST and ALT is less than 35. The hepatitis B viral load was not detected. Complete blood count showing white blood cells 8.2, red blood cells 4.8, hemoglobin 14.6, MCV 93, platelets 204. These are normal. Surface antigen remains positive. If you recall the ultrasound was still showing fatty liver and suspected this is the reason the ALT is slightly above goal. Be sure to work on the diet, exercise, and weight loss and we will review this at the follow-up.  9/21/23 [facility] ultrasound was sent to me. Was normal and size but they did see increased echogenicity in keeping with fatty liver. No focal lesion. The hepatic vasculature patent. They felt the gallbladder was normal in caliber and they did not see any gallstones. They saw a stable polyp that was 6 x 6 x 7 mm. They did not see any intra or extrahepatic biliary ductal dilatation. The right kidney and left kidney both appeared normal. Overall they saw fatty liver and need to continue to work on this with diet, exercise, and weight loss. They noted that the gallbladder polyp has been stable for 5 years which is good to see. We will review at the follow up.   7/5/23 labs were sent to me. The glucose 94, creatinine 0.77, sodium 141, potassium 5.0, bilirubin 0.5, alkaline phosphatase 74, AST 20, ALT 28. Goal for the AST and ALT is less than 35. Previously the AST 20 and ALT 37 ALT slightly lower this check. The complete blood count showing the white blood cells red blood cells hemoglobin and platelets was normal. White blood cells 8.2, red blood cells 4.96, hemoglobin 14.9, MCV 92, platelets 235. The hepatitis B virus was not detected. Surface antigen remains positive. We will see what the ultrasound in August shows and I believe there is some labs to do around that time as well. Please let us know if there are any issues.    3/9/23 March 2 [facility] from [place] ultrasound shows spleen normal at 12 cm and liver 14.8 to 14.9 cm with diffusely increased/coarsened echogenicity. Portal vessels had normal directional flow and hepatic vessels had normal flow. Spleen vessels also were patent. Gallbladder contained a round echogenic structure 0.6 x 0.7 x 0.7 cm that could be a polyp which had not significantly changed versus December 31, 2018 when allowing for differences in technique. Common bile duct was normal at 4 mm. Right kidney 12.3 cm and left kidney 13 cm. Spleen was normal at 11.1 cm. They summarized that you had a stable gallbladder polyp which is not significant change versus 2018 but she had a diffusely echogenic and coarsened appearing liver which they felt could be related to hepatitis but as you know your hepatitis B treatment so that could also be related to fat addition to liver or other process. We will discuss this and course with you and consider doing MRI as a follow up for this at the next visit.  March 2 labs show glucose of 83 which is normal BUN of 13 creatinine 0.83 sodium 143 potassium 4.7 albumin 4.8 bilirubin 0.7 alk phos 66 AST 20 and ALT 30. Ideal ALT less than 35. Hep B DNA remains not detected. White blood cell count 8.8 hemoglobin 15.1 platelet count 234 MCV 91 and normal neutrophils and lymphocytes. Hep B surface antigen remains confirm indicated but positive. Hopefully that will clear but again we will continue to monitor for that possibility.  Prior had some trouble sleeping and not an issue.  August 23 labs show glucose slightly up at 105 and possibly you were not fasting this day? Please share with primary provider. BUN of 17 creatinine 0.84 sodium 141 potassium 4.2 calcium 9.2 albumin 4.5 bilirubin 0.5 alkaline phosphatase 62 AST 19 ALT 27. Back in February her AST was 24 and ALT 34 so these are even lower. Any changes of weight or medicines on to explain this? He says that may have lost some weight. Working out. White blood cell count 9.1 hemoglobin 14.3 platelet count 235 MCV 93 and these are normal. Your mean corpuscular hemoglobin concentration which was previously normal was slightly low this time at 31.4 and please share with primary provider. Neutrophils were normal at 5.3 lymphocytes 2.8. Hep B surface antigen remains confirmed indicated and positive on the confirmation. Hep B DNA remains not detected.   February 22 labs able to be tracked down. White blood cell count 9 hemoglobin 13.5 platelet count 217 MCV 93 and neutrophils 5.7 and lymphocytes 2.1. These are all normal range. Glucose better at 99 BUN is 17 creatinine 0.87 sodium 139 potassium 4.6 chloride 103 CO2 of 22 albumin 4.7 bilirubin 0.6 alkaline phosphatase 64 AST 24 ALT 34. Previously alk phos 58 AST 20 and ALT 30 so these labs are very close to that size but slightly higher. Ideal ALT is still less than 35 so doing well. Hep B surface antigen is positive. Have seen in many pts, that during winter some weight gain and decreased exercise can affect the liver labs. Hopefully better weather now will allow for more ability to do exercise and work on the weight to help this.  Last visit he thought had gained 10 pounds and he thinks he has lost some of it.  February 17 [place] [facility] report in. Liver showed slightly increased echogenicity without definite focal lesion. Spleen was normal at 12.3 cm. Liver vessels patent. Overall they felt the liver was fatty and that you had patent vessels.  Aug 20 2021 glu 109 and bun 23 and cr 0.83 and na 141 and k 4.4 and cl 105 and co2 22 and alb 4.7 and tb 0.4 and alk 58 and ast 20 and alt 30 and wbc 8.9 and hg 14 and mcv 93 and plat 253. hbv pending.  August 12 ultrasound shows technically limited exam. Liver measured 15 cm and the spleen was approximately 13 x 12.6 x 5 cm. Flow was seen in the portal and hepatic veins with normal physiologic direction. Hepatofugal flow was seen in the hepatic veins. Overall they did feel that the exam was technically difficult but that the patency of the vessels was seen and that the spleen was prominent at 13 cm.  He had sciatica and steroids shots x 2 for this pain prior and he says sciatica is off and on.  Feb 2021: u.s: no focal hepatic abnormality. No liver lesions. Liver appears unchanged. Gallbladder polyp 7mm without significant change from prior exam, Spleen normal in size. No kidney hydronephrosis seen. Pancreas obscured by gas. Common duct normal.  Jan 16 2020 liver mildly increased in echogenicity and liver wnl. Right lobe 14cm and no focal mass. Stable gb focus 7mm and no gallstones. cbd 4mm. No evidence of hydronephrosis. spleen 12.5cmx12.8x5.6cm. Stable gb polyp.  Shiv 15 2020 wbc 8.2 hg 14.1 and plat 239 and glu 99 and cr 0.79 and na 140 and k 4.5 and cl 103 and co2 21 and alb 4.6 and tb 0.4 and alk 58 and ast 22 and alt 30 hbv dna not detected.  Dec 4 2019 wbc 7.5 hg 14 and plat 225. glu 95 and cr 0.81 and na 140 and k 4.6 and cl 102 and co2 21 and alb 4.7 and tb 0.7 and 59 andast 21 and alt 27 hbv dna not detected.  Plan: 1. Await the labs from Friday. 2. Pt will do plan for the u.s in Sept. 3. Labs in sept. 4. See then. 5. Pt will stay on vemlidy.  Duration of the visit was 30 minutes with 10 minutes of chart prep and 20 minutes from 83 to 857 am by clock as [facility] clock off due to fluxes for the [facility] teleMed visit reviewing recent records, discussing their current status and the future plans for the patient.

## 2024-06-14 ENCOUNTER — TELEPHONE ENCOUNTER (OUTPATIENT)
Dept: URBAN - METROPOLITAN AREA CLINIC 86 | Facility: CLINIC | Age: 39
End: 2024-06-14

## 2024-06-14 NOTE — HPI-TODAY'S VISIT:
Dear Marita Sabillon,   June 7 ultrasound from [facility] sent in.  Exam was slightly limited due to overlying gas.  Pancreas not well-seen due to overlying gas. Liver had slightly increased echogenicity seen to the liver which could be technical or reflect mild fatty liver.  Would be good for you to work on your fatty liver risk factors regardless to help with same. No discernible liver mass or bile duct dilation seen. Relatively unchanged 8 mm gallbladder polyp seen with no shadowing stones or inflammatory change.  Soto sign negative. Common bile duct normal. Right kidney unremarkable. They overall felt that you have a stable gallbladder polyp and mild fatty liver changes. As a reminder, your prior ultrasound suggested that you had an 8 mm gallbladder polyp as well.  The key issue was that they did not see this time the coarsened appearance of the liver which was mentioned previously in March and since that is doing better, we can slow down to do this u.s again in December. Please again set up the ultrasound now for December since everything appears to be stable. Dr. Gonzalez

## 2024-08-13 ENCOUNTER — TELEPHONE ENCOUNTER (OUTPATIENT)
Dept: URBAN - METROPOLITAN AREA CLINIC 91 | Facility: CLINIC | Age: 39
End: 2024-08-13

## 2024-08-13 RX ORDER — TENOFOVIR ALAFENAMIDE 25 MG/1
1 TABLET WITH FOOD TABLET ORAL ONCE A DAY
Qty: 90 | Refills: 0
End: 2024-11-11

## 2024-09-01 ENCOUNTER — LAB OUTSIDE AN ENCOUNTER (OUTPATIENT)
Dept: URBAN - METROPOLITAN AREA TELEHEALTH 2 | Facility: TELEHEALTH | Age: 39
End: 2024-09-01

## 2024-09-02 ENCOUNTER — LAB OUTSIDE AN ENCOUNTER (OUTPATIENT)
Dept: URBAN - METROPOLITAN AREA TELEHEALTH 2 | Facility: TELEHEALTH | Age: 39
End: 2024-09-02

## 2024-09-13 ENCOUNTER — TELEPHONE ENCOUNTER (OUTPATIENT)
Dept: URBAN - METROPOLITAN AREA CLINIC 86 | Facility: CLINIC | Age: 39
End: 2024-09-13

## 2024-09-13 NOTE — HPI-TODAY'S VISIT:
Cirilo Kirkland Sabillno,  Sept 11:  Liver normal size and normal echotetxure. No focal mass seen.  Gallbaldder had 6mm polyp and felt stable and normal in caliber. No gallstones or sludge. No wall thickening or pericholecystic fluid and negative Soto sign.  Bile ducts showed no intrahepatic or extrahepatic bile duct dilation. Extrahepatic bile duct 3mm.  Pacreas predominately not seen due to gas.  Right kidney 13cm and left kidney 13cm and no hydronephrosis.  Spleen 12cm normal.  Summary: no focal hepatic mass and stable 6mm gallbladder polyp.  Dr Gonzalez

## 2024-09-19 ENCOUNTER — TELEPHONE ENCOUNTER (OUTPATIENT)
Dept: URBAN - METROPOLITAN AREA CLINIC 86 | Facility: CLINIC | Age: 39
End: 2024-09-19

## 2024-09-19 NOTE — HPI-TODAY'S VISIT:
Cirilo Sabillon,  September 11 labs show comparison with prior scan. Liver measures 14.4 cm and spleen measured 12.6 cm.  Both appeared to be even in echotexture.  No ascites was seen. Normal directional flow was seen to the liver vessels. Overall, liver vessels flow appeared to be patent.  Regular ultrasound read by other person: It mentions no liver masses were seen.  They state that you did have a 6 mm gallbladder polyp that was felt to be stable.  No gallbladder wall thickening.  Soto sign negative.  No bile duct dilation was seen with the bile duct being 3 mm.  Pancreas predominantly not seen due to gas.  Right kidney 13 cm and left kidney 13 cm with no hydronephrosis.  The second radiologist felt that your spleen size by their estimate was approximately 12 cm which is about the same as the other radiologist and just goes to show how even at the same center 2 different radiologist may measure it slightly different.  Overall, they felt you had no focal mass and a stable 6 mm polyp.  I would point out that on the official report from March of this year the gallbladder polyp was estimated to be 8 mm but again different radiologist may measured differently and the current radiologist mentioned they thought it was stable.

## 2024-09-28 ENCOUNTER — TELEPHONE ENCOUNTER (OUTPATIENT)
Dept: URBAN - METROPOLITAN AREA CLINIC 86 | Facility: CLINIC | Age: 39
End: 2024-09-28

## 2024-09-28 LAB
ALBUMIN: 4.4
ALKALINE PHOSPHATASE: 64
ALT (SGPT): 26
AST (SGOT): 20
BASO (ABSOLUTE): 0
BASOS: 0
BILIRUBIN, TOTAL: 0.6
BUN/CREATININE RATIO: 21
BUN: 19
CALCIUM: 9.2
CARBON DIOXIDE, TOTAL: 24
CHLORIDE: 104
CREATININE: 0.9
EGFR: 112
EOS (ABSOLUTE): 0.2
EOS: 2
GLOBULIN, TOTAL: 2.7
GLUCOSE: 103
HBV LOG10: (no result)
HEMATOCRIT: 45.2
HEMATOLOGY COMMENTS:: (no result)
HEMOGLOBIN: 15
HEPATITIS B QUANTITATION: <10
IMMATURE CELLS: (no result)
IMMATURE GRANS (ABS): 0
IMMATURE GRANULOCYTES: 0
LYMPHS (ABSOLUTE): 2.6
LYMPHS: 32
MCH: 30.7
MCHC: 33.2
MCV: 92
MONOCYTES(ABSOLUTE): 0.6
MONOCYTES: 8
NEUTROPHILS (ABSOLUTE): 4.6
NEUTROPHILS: 58
NRBC: (no result)
PLATELETS: 226
POTASSIUM: 4.4
PROTEIN, TOTAL: 7.1
RBC: 4.89
RDW: 12.1
SODIUM: 141
TEST INFORMATION:: (no result)
WBC: 8

## 2024-09-28 NOTE — HPI-TODAY'S VISIT:
Dear Marita Sabillon, September 24 labs showed hep B less than 10 and not mentioned as being detected. Glucose was 103 slightly up and unclear if you are fasting that day?  BUN of 19 and creatinine 0.9 while both normal a little higher than your June labs that showed a BUN of 16 and creatinine 0.76.  Unclear if you are running a little dry that day?  Sodium 141 potassium 4.4 calcium 9.2 4.4 bilirubin 0.6 alk phos 64 and AST low at 20 and ALT low at 26 with ideal ALT less than 35.  Due to see that the liver labs are actually lower now. WBC 8.0 hemoglobin 15 platelet count 226 MCV 92 and normal neutrophils and lymphocytes. Please share labs with local providers. Dr. Gonzalez

## 2024-10-30 ENCOUNTER — ERX REFILL RESPONSE (OUTPATIENT)
Dept: URBAN - METROPOLITAN AREA CLINIC 86 | Facility: CLINIC | Age: 39
End: 2024-10-30

## 2024-10-30 RX ORDER — TENOFOVIR ALAFENAMIDE 25 MG/1
TAKE ONE TABLET (25MG) BY MOUTH ONCE DAILY WITH FOOD TABLET ORAL
Qty: 90 TABLET | Refills: 0 | OUTPATIENT

## 2024-10-30 RX ORDER — TENOFOVIR ALAFENAMIDE 25 MG/1
1 TABLET WITH FOOD TABLET ORAL ONCE A DAY
Qty: 90 | Refills: 0 | OUTPATIENT

## 2024-12-02 ENCOUNTER — LAB OUTSIDE AN ENCOUNTER (OUTPATIENT)
Dept: URBAN - METROPOLITAN AREA TELEHEALTH 2 | Facility: TELEHEALTH | Age: 39
End: 2024-12-02

## 2024-12-02 ENCOUNTER — LAB OUTSIDE AN ENCOUNTER (OUTPATIENT)
Dept: URBAN - METROPOLITAN AREA CLINIC 86 | Facility: CLINIC | Age: 39
End: 2024-12-02

## 2024-12-11 ENCOUNTER — OFFICE VISIT (OUTPATIENT)
Dept: URBAN - METROPOLITAN AREA TELEHEALTH 2 | Facility: TELEHEALTH | Age: 39
End: 2024-12-11
Payer: COMMERCIAL

## 2024-12-11 VITALS — WEIGHT: 222 LBS | HEIGHT: 68 IN | BODY MASS INDEX: 33.65 KG/M2

## 2024-12-11 DIAGNOSIS — M54.30 SCIATIC LEG PAIN: ICD-10-CM

## 2024-12-11 DIAGNOSIS — E66.811 OBESITY, CLASS 1: ICD-10-CM

## 2024-12-11 DIAGNOSIS — R89.4 HEPATITIS A ANTIBODY POSITIVE: ICD-10-CM

## 2024-12-11 DIAGNOSIS — K76.0 FATTY LIVER: ICD-10-CM

## 2024-12-11 DIAGNOSIS — Z79.899 ENCOUNTER FOR LONG-TERM (CURRENT) USE OF HIGH-RISK MEDICATION: ICD-10-CM

## 2024-12-11 DIAGNOSIS — K82.4 GALL BLADDER POLYP: ICD-10-CM

## 2024-12-11 DIAGNOSIS — B18.1 CHRONIC HEPATITIS B: ICD-10-CM

## 2024-12-11 DIAGNOSIS — Z71.89 VACCINE COUNSELING: ICD-10-CM

## 2024-12-11 PROBLEM — 443371000124107: Status: ACTIVE | Noted: 2024-12-11

## 2024-12-11 PROCEDURE — 99215 OFFICE O/P EST HI 40 MIN: CPT

## 2024-12-11 RX ORDER — TENOFOVIR ALAFENAMIDE 25 MG/1
1 TABLET WITH FOOD TABLET ORAL ONCE A DAY
Qty: 30 TABLET | Refills: 5

## 2024-12-11 RX ORDER — TENOFOVIR ALAFENAMIDE 25 MG/1
TAKE ONE TABLET (25MG) BY MOUTH ONCE DAILY WITH FOOD TABLET ORAL
Qty: 90 TABLET | Refills: 0 | Status: ACTIVE | COMMUNITY

## 2024-12-11 NOTE — HPI-TODAY'S VISIT:
Pt is a 38 year old  male, after a previous visit in June 2024 for an evaluation for hepatitis B and on tx response.  He had completed 6 years of treatment with viread. He has been now on vemlidy since spring 2017. The patient has been compliant with his treatment regimen.  Dec u.s pending.  Dec 9 2024 glu 99 and bun 17 and cr 0.87 and na 141 and k 4.6 and cl 104 and c02 24 and ca 9.4 and alb 4.4 andtb 0.6 and alk 64 and ast 30 and alt 44. HBV 10  dna. wbc 7.3 and hg 14.7 and mcv 95 and platelets 225. Neutophils 4.4 and lymphs 2.1   September 24 labs showed hep B less than 10 and not mentioned as being detected. Glucose was 103 slightly up and unclear if you are fasting that day? BUN of 19 and creatinine 0.9 while both normal a little higher than your June labs that showed a BUN of 16 and creatinine 0.76. Unclear if you are running a little dry that day? Sodium 141 potassium 4.4 calcium 9.2 4.4 bilirubin 0.6 alk phos 64 and AST low at 20 and ALT low at 26 with ideal ALT less than 35.  WBC 8.0 hemoglobin 15 platelet count 226 MCV 92 and normal neutrophils and lymphocytes.  Asked why labs may be off. Celery drink having and no other medicine in it.  U.s Monday at [facility] in [place].  September 11 labs show comparison with prior scan. Liver measures 14.4 cm and spleen measured 12.6 cm.  Both appeared to be even in echotexture.  No ascites was seen. Normal directional flow was seen to the liver vessels. Overall, liver vessels flow appeared to be patent. Regular ultrasound read by other person: It mentions no liver masses were seen. They state that you did have a 6 mm gallbladder polyp that was felt to be stable.  No gallbladder wall thickening.  Soto sign negative.  No bile duct dilation was seen with the bile duct being 3 mm.  Pancreas predominantly not seen due to gas.  Right kidney 13 cm and left kidney 13 cm with no hydronephrosis.  The second radiologist felt that your spleen size by their estimate was approximately 12 cm which is about the same as the other radiologist and just goes to show how even at the same center 2 different radiologist may measure it slightly different.  Overall, they felt you had no focal mass and a stable 6 mm polyp.  I would point out that on the official report from March of this year the gallbladder polyp was estimated to be 8 mm but again different radiologist may measured differently and the current radiologist mentioned they thought it was stable.   Sept 11:  Liver normal size and normal echotetxure. No focal mass seen.  Gallbaldder had 6mm polyp and felt stable and normal in caliber. No gallstones or sludge. No wall thickening or pericholecystic fluid and negative Soto sign.  Bile ducts showed no intrahepatic or extrahepatic bile duct dilation. Extrahepatic bile duct 3mm.  Pacreas predominately not seen due to gas.  Right kidney 13cm and left kidney 13cm and no hydronephrosis.  Spleen 12cm normal.  Summary: no focal hepatic mass and stable 6mm gallbladder polyp.   June 7 ultrasound from [facility] sent in. Exam was slightly limited due to overlying gas. Pancreas not well-seen due to overlying gas. Liver had slightly increased echogenicity seen to the liver which could be technical or reflect mild fatty liver. Would be good for you to work on your fatty liver risk factors regardless to help with same.  No discernible liver mass or bile duct dilation seen.  Relatively unchanged 8 mm gallbladder polyp seen with no shadowing stones or inflammatory change. Soto sign negative.  Common bile duct normal.  Right kidney unremarkable.  They overall felt that you have a stable gallbladder polyp and mild fatty liver changes.  As a reminder, your prior ultrasound suggested that you had an 8 mm gallbladder polyp as well. The key issue was that they did not see this time the coarsened appearance of the liver which was mentioned previously in March and since that is doing better, we can slow down to do this u.s again in December.  Please again set up the ultrasound now for December since everything appears to be stable.      June 7 labs show glucose 93 BUN of 15 creatinine 0.76 with a creatinine actually lower than 0.8 in March and 0.85 on September and shows once again vemlidy has less renal issues than generic viread.  Sodium 138 potassium 4.5 calcium 8.9 albumin 4.4 bilirubin 0.6 alk phos 70 AST 25 and ALT 39 good to see the ALT be even lower.  Hep B DNA back to being not detected as it was previously 10 back in March and prior to that had been undetectable. WBC 7.2 hemoglobin 14.8 platelet count 217 MCV 92 with neutrophils 4.1 and lymphocytes 2.2. Very happy to see that your labs are stable for you. Please share with local providers. Please redo labs in 3 and 6 months for us.  Dr. Gonzalez  March 6 u.s showed coarse appearance and increased echogenicity of the echotexture of the liver and no focal lesions and max diameter of 14.8cm inmid clavicular line and normal intrahepatic IVC. Hepatopedal flow in the PV and gb stable 8 mm polyp and 2mm gb wall thickness. 4mm cbd. aorta 1.9cm and pancreas unremarkable and tail not seen due to gas right kidney 12.4cmand left 12cm and spleen 12.5cm. liver coarse and stable gb polyp.  March 6 labs show HBV dna 10 and prior not detected x 2 times. Did your meds lapse? Did merlyn miss a few days? No real reason. Glucose 96 BUN of 18 creatinine 0.89. Stable. Previously creatinine 0.85's were actually came down a little. Sodium 140 potassium 4.5 calcium 9.3 albumin 4.5 bilirubin 0.7 alk phos 67 which is lower AST of 23 and ALT 36 which are both slightly lower than last time. WBC 8.5, hemoglobin 14.8 and platelet count 246 MCV 91. Neutrophils and lymphocytes normal.   9/28/23 labs were sent to me. Creatinine 0.85, sodium 139, potassium 4.3, bilirubin 0.7, alkaline phosphatase 72, AST 25, ALT 37. Goal for the AST and ALT is less than 35. The hepatitis B viral load was not detected. Complete blood count showing white blood cells 8.2, red blood cells 4.8, hemoglobin 14.6, MCV 93, platelets 204. These are normal. Surface antigen remains positive. If you recall the ultrasound was still showing fatty liver and suspected this is the reason the ALT is slightly above goal. Be sure to work on the diet, exercise, and weight loss and we will review this at the follow-up.  9/21/23 [facility] ultrasound was sent to me. Was normal and size but they did see increased echogenicity in keeping with fatty liver. No focal lesion. The hepatic vasculature patent. They felt the gallbladder was normal in caliber and they did not see any gallstones. They saw a stable polyp that was 6 x 6 x 7 mm. They did not see any intra or extrahepatic biliary ductal dilatation. The right kidney and left kidney both appeared normal. Overall they saw fatty liver and need to continue to work on this with diet, exercise, and weight loss. They noted that the gallbladder polyp has been stable for 5 years which is good to see. We will review at the follow up.   7/5/23 labs were sent to me. The glucose 94, creatinine 0.77, sodium 141, potassium 5.0, bilirubin 0.5, alkaline phosphatase 74, AST 20, ALT 28. Goal for the AST and ALT is less than 35. Previously the AST 20 and ALT 37 ALT slightly lower this check. The complete blood count showing the white blood cells red blood cells hemoglobin and platelets was normal. White blood cells 8.2, red blood cells 4.96, hemoglobin 14.9, MCV 92, platelets 235. The hepatitis B virus was not detected. Surface antigen remains positive. We will see what the ultrasound in August shows and I believe there is some labs to do around that time as well. Please let us know if there are any issues.    3/9/23 March 2 [facility] from [place] ultrasound shows spleen normal at 12 cm and liver 14.8 to 14.9 cm with diffusely increased/coarsened echogenicity. Portal vessels had normal directional flow and hepatic vessels had normal flow. Spleen vessels also were patent. Gallbladder contained a round echogenic structure 0.6 x 0.7 x 0.7 cm that could be a polyp which had not significantly changed versus December 31, 2018 when allowing for differences in technique. Common bile duct was normal at 4 mm. Right kidney 12.3 cm and left kidney 13 cm. Spleen was normal at 11.1 cm. They summarized that you had a stable gallbladder polyp which is not significant change versus 2018 but she had a diffusely echogenic and coarsened appearing liver which they felt could be related to hepatitis but as you know your hepatitis B treatment so that could also be related to fat addition to liver or other process. We will discuss this and course with you and consider doing MRI as a follow up for this at the next visit.  March 2 labs show glucose of 83 which is normal BUN of 13 creatinine 0.83 sodium 143 potassium 4.7 albumin 4.8 bilirubin 0.7 alk phos 66 AST 20 and ALT 30. Ideal ALT less than 35. Hep B DNA remains not detected. White blood cell count 8.8 hemoglobin 15.1 platelet count 234 MCV 91 and normal neutrophils and lymphocytes. Hep B surface antigen remains confirm indicated but positive. Hopefully that will clear but again we will continue to monitor for that possibility.  Prior had some trouble sleeping and not an issue.  August 23 labs show glucose slightly up at 105 and possibly you were not fasting this day? Please share with primary provider. BUN of 17 creatinine 0.84 sodium 141 potassium 4.2 calcium 9.2 albumin 4.5 bilirubin 0.5 alkaline phosphatase 62 AST 19 ALT 27. Back in February her AST was 24 and ALT 34 so these are even lower. Any changes of weight or medicines on to explain this? He says that may have lost some weight. Working out. White blood cell count 9.1 hemoglobin 14.3 platelet count 235 MCV 93 and these are normal. Your mean corpuscular hemoglobin concentration which was previously normal was slightly low this time at 31.4 and please share with primary provider. Neutrophils were normal at 5.3 lymphocytes 2.8. Hep B surface antigen remains confirmed indicated and positive on the confirmation. Hep B DNA remains not detected.   February 22 labs able to be tracked down. White blood cell count 9 hemoglobin 13.5 platelet count 217 MCV 93 and neutrophils 5.7 and lymphocytes 2.1. These are all normal range. Glucose better at 99 BUN is 17 creatinine 0.87 sodium 139 potassium 4.6 chloride 103 CO2 of 22 albumin 4.7 bilirubin 0.6 alkaline phosphatase 64 AST 24 ALT 34. Previously alk phos 58 AST 20 and ALT 30 so these labs are very close to that size but slightly higher. Ideal ALT is still less than 35 so doing well. Hep B surface antigen is positive. Have seen in many pts, that during winter some weight gain and decreased exercise can affect the liver labs. Hopefully better weather now will allow for more ability to do exercise and work on the weight to help this.  Last visit he thought had gained 10 pounds and he thinks he has lost some of it.  February 17 [place] [facility] report in. Liver showed slightly increased echogenicity without definite focal lesion. Spleen was normal at 12.3 cm. Liver vessels patent. Overall they felt the liver was fatty and that you had patent vessels.  Aug 20 2021 glu 109 and bun 23 and cr 0.83 and na 141 and k 4.4 and cl 105 and co2 22 and alb 4.7 and tb 0.4 and alk 58 and ast 20 and alt 30 and wbc 8.9 and hg 14 and mcv 93 and plat 253. hbv pending.  August 12 ultrasound shows technically limited exam. Liver measured 15 cm and the spleen was approximately 13 x 12.6 x 5 cm. Flow was seen in the portal and hepatic veins with normal physiologic direction. Hepatofugal flow was seen in the hepatic veins. Overall they did feel that the exam was technically difficult but that the patency of the vessels was seen and that the spleen was prominent at 13 cm.  He had sciatica and steroids shots x 2 for this pain prior and he says sciatica is off and on.  Feb 2021: u.s: no focal hepatic abnormality. No liver lesions. Liver appears unchanged. Gallbladder polyp 7mm without significant change from prior exam, Spleen normal in size. No kidney hydronephrosis seen. Pancreas obscured by gas. Common duct normal.  Jan 16 2020 liver mildly increased in echogenicity and liver wnl. Right lobe 14cm and no focal mass. Stable gb focus 7mm and no gallstones. cbd 4mm. No evidence of hydronephrosis. spleen 12.5cmx12.8x5.6cm. Stable gb polyp.  Shiv 15 2020 wbc 8.2 hg 14.1 and plat 239 and glu 99 and cr 0.79 and na 140 and k 4.5 and cl 103 and co2 21 and alb 4.6 and tb 0.4 and alk 58 and ast 22 and alt 30 hbv dna not detected.  Dec 4 2019 wbc 7.5 hg 14 and plat 225. glu 95 and cr 0.81 and na 140 and k 4.6 and cl 102 and co2 21 and alb 4.7 and tb 0.7 and 59 andast 21 and alt 27 hbv dna not detected.  Plan: 1. Await the u.s that he did and plan for labs in 1m and 3m and 6m. Stop celery drink. 2. Pt will do u.s in 6m. 3. See us in 6m 4. Stay on the vemlidy.  Duration of the visit was 42 minutes with 15 minutes of chart prep and 27 minutes by dox video with time spent for visit reviewing recent records, discussing their current status and the future plans for the patient.

## 2024-12-13 ENCOUNTER — TELEPHONE ENCOUNTER (OUTPATIENT)
Dept: URBAN - METROPOLITAN AREA CLINIC 86 | Facility: CLINIC | Age: 39
End: 2024-12-13

## 2024-12-13 NOTE — HPI-TODAY'S VISIT:
Dear Marita Sabillon, December 9 ultrasound sent to us. Liver appeared unremarkable. They mentioned that you had an 8 mm nonshadowing nonmobile echogenic focus adherent to the gallbladder wall that they felt was in keeping with a polyp and it was previously 6 mm. (Prior to that 8mm and a few years ago 7mm). Common bile duct was normal at 4.5 mm. Spleen and visualized pancreas portions unremarkable although the pancreas is partially obscured and portions of the pancreas are partially not seen due to overlying gas. Kidneys were prominent with the right kidney 13.4 cm and left 12.6 cm.  No stones or hydronephrosis. Liver vessels patent. Overall, they felt liver was unremarkable and they saw a 8 mm gallbladder polyp.   I know that you saw a surgeon previously locally and they had mentioned than that there was no need for gb surgery, but it is appearing to be somewhat larger now at 8 mm (wonder if the 6mm was correct as prior 8mm?).Typically if more 10mmg then surgery is considered or if you have pain or if it is growing.   I can also set you up to see a surgeon here for an opinion as your approaching the cutoff of 10 mm to consider surgery more you can see the surgeon locally. If any symptoms from it also, then please let us know.    Called pt and we discussed the issues and he wants to wait for the next scan and see what the gb polyp does and he can also show it to the other surgeon locally that he saw to just be aware of the issues in case it grows.   Dr. Gonzalez

## 2025-01-06 ENCOUNTER — LAB OUTSIDE AN ENCOUNTER (OUTPATIENT)
Dept: URBAN - METROPOLITAN AREA TELEHEALTH 2 | Facility: TELEHEALTH | Age: 40
End: 2025-01-06

## 2025-02-13 ENCOUNTER — ERX REFILL RESPONSE (OUTPATIENT)
Dept: URBAN - METROPOLITAN AREA CLINIC 86 | Facility: CLINIC | Age: 40
End: 2025-02-13

## 2025-02-13 RX ORDER — TENOFOVIR ALAFENAMIDE 25 MG/1
TAKE ONE TABLET (25MG) BY MOUTH ONCE DAILY WITH FOOD TABLET ORAL
Qty: 90 TABLET | Refills: 0 | OUTPATIENT

## 2025-03-06 ENCOUNTER — LAB OUTSIDE AN ENCOUNTER (OUTPATIENT)
Dept: URBAN - METROPOLITAN AREA TELEHEALTH 2 | Facility: TELEHEALTH | Age: 40
End: 2025-03-06

## 2025-06-01 ENCOUNTER — LAB OUTSIDE AN ENCOUNTER (OUTPATIENT)
Dept: URBAN - METROPOLITAN AREA TELEHEALTH 2 | Facility: TELEHEALTH | Age: 40
End: 2025-06-01

## 2025-06-03 ENCOUNTER — TELEPHONE ENCOUNTER (OUTPATIENT)
Dept: URBAN - METROPOLITAN AREA CLINIC 86 | Facility: CLINIC | Age: 40
End: 2025-06-03

## 2025-06-03 RX ORDER — TENOFOVIR ALAFENAMIDE 25 MG/1
TAKE ONE TABLET (25MG) BY MOUTH ONCE DAILY WITH FOOD TABLET ORAL
Qty: 90 TABLET | Refills: 0

## 2025-06-06 ENCOUNTER — LAB OUTSIDE AN ENCOUNTER (OUTPATIENT)
Dept: URBAN - METROPOLITAN AREA TELEHEALTH 2 | Facility: TELEHEALTH | Age: 40
End: 2025-06-06

## 2025-06-09 ENCOUNTER — LAB OUTSIDE AN ENCOUNTER (OUTPATIENT)
Dept: URBAN - METROPOLITAN AREA CLINIC 86 | Facility: CLINIC | Age: 40
End: 2025-06-09

## 2025-06-10 ENCOUNTER — TELEPHONE ENCOUNTER (OUTPATIENT)
Dept: URBAN - METROPOLITAN AREA CLINIC 86 | Facility: CLINIC | Age: 40
End: 2025-06-10

## 2025-06-10 LAB
ALBUMIN: 4.5
ALKALINE PHOSPHATASE: 62
ALT (SGPT): 25
AST (SGOT): 17
BASO (ABSOLUTE): 0.1
BASOS: 1
BILIRUBIN, TOTAL: 0.5
BUN/CREATININE RATIO: 28
BUN: 20
CALCIUM: 9.1
CARBON DIOXIDE, TOTAL: 22
CHLORIDE: 103
CREATININE: 0.71
EGFR: 120
EOS (ABSOLUTE): 0.2
EOS: 2
GLOBULIN, TOTAL: 2.4
GLUCOSE: 90
HBV LOG10: (no result)
HEMATOCRIT: 43.3
HEMATOLOGY COMMENTS:: (no result)
HEMOGLOBIN: 13.9
HEPATITIS B QUANTITATION: (no result)
IMMATURE CELLS: (no result)
IMMATURE GRANS (ABS): 0
IMMATURE GRANULOCYTES: 0
LYMPHS (ABSOLUTE): 2.2
LYMPHS: 31
Lab: (no result)
MCH: 30.9
MCHC: 32.1
MCV: 96
MONOCYTES(ABSOLUTE): 0.6
MONOCYTES: 9
NEUTROPHILS (ABSOLUTE): 4.2
NEUTROPHILS: 57
NRBC: (no result)
PLATELETS: 214
POTASSIUM: 4.6
PROTEIN, TOTAL: 6.9
RBC: 4.5
RDW: 12.4
REQUEST PROBLEM: (no result)
SODIUM: 140
TEST INFORMATION:: (no result)
WBC: 7.2

## 2025-06-10 NOTE — HPI-TODAY'S VISIT:
Dear Marita Sabillon,    June 9 labs show that there was a problem with your hep B DNA level and they were not able to run it.   Glucose was 98 BUN of 20 creatinine 0.7 which is a little low for you.  Previously had been 0.9 back in September of last year and 0.87 in December.   Sodium 140 potassium 4.6 calcium 9.1 albumin 4.5 bilirubin 0.5 alk phos 62 which is a little lower for you AST 17 and ALT 25 and these are actually lower for you more like the September labs and your December labs which were little higher.   Ideal ALT less than 35.   WBC 7.2 hemoglobin 13.9 MCV 96 platelet count 214 with neutrophils 4.2 lymphocytes 2.2.   Hopefully they called you to redo your hep B level and if not we will need to set you up for that.  If they charged you for that they should not charge you for the repeat draw as something happened with sample.    Dr. Gonzalez.

## 2025-06-11 ENCOUNTER — OFFICE VISIT (OUTPATIENT)
Dept: URBAN - METROPOLITAN AREA TELEHEALTH 2 | Facility: TELEHEALTH | Age: 40
End: 2025-06-11
Payer: COMMERCIAL

## 2025-06-11 DIAGNOSIS — B18.1 CHRONIC HEPATITIS B: ICD-10-CM

## 2025-06-11 DIAGNOSIS — M54.30 SCIATIC LEG PAIN: ICD-10-CM

## 2025-06-11 DIAGNOSIS — Z79.899 ENCOUNTER FOR LONG-TERM (CURRENT) USE OF HIGH-RISK MEDICATION: ICD-10-CM

## 2025-06-11 DIAGNOSIS — R89.4 HEPATITIS A ANTIBODY POSITIVE: ICD-10-CM

## 2025-06-11 DIAGNOSIS — K76.0 FATTY LIVER: ICD-10-CM

## 2025-06-11 DIAGNOSIS — K82.4 GALL BLADDER POLYP: ICD-10-CM

## 2025-06-11 DIAGNOSIS — Z71.89 VACCINE COUNSELING: ICD-10-CM

## 2025-06-11 DIAGNOSIS — E66.811 OBESITY, CLASS 1: ICD-10-CM

## 2025-06-11 PROCEDURE — 99214 OFFICE O/P EST MOD 30 MIN: CPT

## 2025-06-11 RX ORDER — TENOFOVIR ALAFENAMIDE 25 MG/1
TAKE ONE TABLET (25MG) BY MOUTH ONCE DAILY WITH FOOD TABLET ORAL
Qty: 90 TABLET | Refills: 0 | Status: ACTIVE | COMMUNITY

## 2025-06-11 RX ORDER — TENOFOVIR ALAFENAMIDE 25 MG/1
1 TABLET WITH FOOD TABLET ORAL ONCE A DAY
Qty: 30 TABLET | Refills: 5
Start: 2025-06-07 | End: 2025-12-04

## 2025-06-11 NOTE — HPI-TODAY'S VISIT:
Pt is a 39 year old  male, after a previous visit in Dec  2024 for an evaluation for hepatitis B and on tx response.  He had completed 6 years of treatment with viread. He has been now on vemlidy since spring 2017. The patient has been compliant with his treatment regimen.  U.s was done yesterday at [facility] in [place].  June 9 labs show that there was a problem with your hep B DNA level and they were not able to run it. Glucose was 98 BUN of 20 creatinine 0.7 which is a little low for you.  Previously had been 0.9 back in September of last year and 0.87 in December. Sodium 140 potassium 4.6 calcium 9.1 albumin 4.5 bilirubin 0.5 alk phos 62 which is a little lower for you AST 17 and ALT 25 and these are actually lower for you more like the September labs and your December labs which were little higher. Ideal ALT less than 35. WBC 7.2 hemoglobin 13.9 MCV 96 platelet count 214 with neutrophils 4.2 lymphocytes 2.2. Hopefully they called you to redo your hep B level and if not we will need to set you up for that.  If they charged you for that they should not charge you for the repeat draw as something happened with sample.  December 9 2024 ultrasound sent to us. Liver appeared unremarkable. They mentioned that you had an 8 mm nonshadowing nonmobile echogenic focus adherent to the gallbladder wall that they felt was in keeping with a polyp and it was previously 6 mm. (Prior to that 8mm and a few years ago 7mm). Common bile duct was normal at 4.5 mm. Spleen and visualized pancreas portions unremarkable although the pancreas is partially obscured and portions of the pancreas are partially not seen due to overlying gas. Kidneys were prominent with the right kidney 13.4 cm and left 12.6 cm. No stones or hydronephrosis. Liver vessels patent. Overall, they felt liver was unremarkable and they saw a 8 mm gallbladder polyp.  Discussed possibel surgery if gb polyp if 10mm or older.  I know that you saw a surgeon previously locally and they had mentioned than that there was no need for gb surgery, but it is appearing to be somewhat larger now at 8 mm (wonder if the 6mm was correct as prior 8mm?).Typically if more 10mmg then surgery is considered or if you have pain or if it is growing.  Dec 9 2024 glu 99 and bun 17 and cr 0.87 and na 141 and k 4.6 and cl 104 and c02 24 and ca 9.4 and alb 4.4 andtb 0.6 and alk 64 and ast 30 and alt 44. HBV 10  dna. wbc 7.3 and hg 14.7 and mcv 95 and platelets 225. Neutophils 4.4 and lymphs 2.1   September 24 labs showed hep B less than 10 and not mentioned as being detected. Glucose was 103 slightly up and unclear if you are fasting that day? BUN of 19 and creatinine 0.9 while both normal a little higher than your June labs that showed a BUN of 16 and creatinine 0.76. Unclear if you are running a little dry that day? Sodium 141 potassium 4.4 calcium 9.2 4.4 bilirubin 0.6 alk phos 64 and AST low at 20 and ALT low at 26 with ideal ALT less than 35.  WBC 8.0 hemoglobin 15 platelet count 226 MCV 92 and normal neutrophils and lymphocytes.  September 11 labs show comparison with prior scan. Liver measures 14.4 cm and spleen measured 12.6 cm.  Both appeared to be even in echotexture.  No ascites was seen. Normal directional flow was seen to the liver vessels. Overall, liver vessels flow appeared to be patent. Regular ultrasound read by other person: It mentions no liver masses were seen. They state that you did have a 6 mm gallbladder polyp that was felt to be stable.  No gallbladder wall thickening.  Soto sign negative.  No bile duct dilation was seen with the bile duct being 3 mm.  Pancreas predominantly not seen due to gas.  Right kidney 13 cm and left kidney 13 cm with no hydronephrosis.  The second radiologist felt that your spleen size by their estimate was approximately 12 cm which is about the same as the other radiologist and just goes to show how even at the same center 2 different radiologist may measure it slightly different.  Overall, they felt you had no focal mass and a stable 6 mm polyp.  I would point out that on the official report from March of this year the gallbladder polyp was estimated to be 8 mm but again different radiologist may measured differently and the current radiologist mentioned they thought it was stable.   Sept 11:  Liver normal size and normal echotetxure. No focal mass seen.  Gallbaldder had 6mm polyp and felt stable and normal in caliber. No gallstones or sludge. No wall thickening or pericholecystic fluid and negative Soto sign.  Bile ducts showed no intrahepatic or extrahepatic bile duct dilation. Extrahepatic bile duct 3mm.  Pacreas predominately not seen due to gas.  Right kidney 13cm and left kidney 13cm and no hydronephrosis.  Spleen 12cm normal.  Summary: no focal hepatic mass and stable 6mm gallbladder polyp.   June 7 ultrasound from [facility] sent in. Exam was slightly limited due to overlying gas. Pancreas not well-seen due to overlying gas. Liver had slightly increased echogenicity seen to the liver which could be technical or reflect mild fatty liver. Would be good for you to work on your fatty liver risk factors regardless to help with same.  No discernible liver mass or bile duct dilation seen.  Relatively unchanged 8 mm gallbladder polyp seen with no shadowing stones or inflammatory change. Soto sign negative.  Common bile duct normal.  Right kidney unremarkable.  They overall felt that you have a stable gallbladder polyp and mild fatty liver changes.  As a reminder, your prior ultrasound suggested that you had an 8 mm gallbladder polyp as well. The key issue was that they did not see this time the coarsened appearance of the liver which was mentioned previously in March and since that is doing better, we can slow down to do this u.s again in December.  Please again set up the ultrasound now for December since everything appears to be stable.      June 7 labs show glucose 93 BUN of 15 creatinine 0.76 with a creatinine actually lower than 0.8 in March and 0.85 on September and shows once again vemlidy has less renal issues than generic viread.  Sodium 138 potassium 4.5 calcium 8.9 albumin 4.4 bilirubin 0.6 alk phos 70 AST 25 and ALT 39 good to see the ALT be even lower.  Hep B DNA back to being not detected as it was previously 10 back in March and prior to that had been undetectable. WBC 7.2 hemoglobin 14.8 platelet count 217 MCV 92 with neutrophils 4.1 and lymphocytes 2.2. Very happy to see that your labs are stable for you. Please share with local providers. Please redo labs in 3 and 6 months for us.  Dr. Gonzalez  March 6 u.s showed coarse appearance and increased echogenicity of the echotexture of the liver and no focal lesions and max diameter of 14.8cm inmid clavicular line and normal intrahepatic IVC. Hepatopedal flow in the PV and gb stable 8 mm polyp and 2mm gb wall thickness. 4mm cbd. aorta 1.9cm and pancreas unremarkable and tail not seen due to gas right kidney 12.4cmand left 12cm and spleen 12.5cm. liver coarse and stable gb polyp.  March 6 labs show HBV dna 10 and prior not detected x 2 times. Did your meds lapse? Did merlyn miss a few days? No real reason. Glucose 96 BUN of 18 creatinine 0.89. Stable. Previously creatinine 0.85's were actually came down a little. Sodium 140 potassium 4.5 calcium 9.3 albumin 4.5 bilirubin 0.7 alk phos 67 which is lower AST of 23 and ALT 36 which are both slightly lower than last time. WBC 8.5, hemoglobin 14.8 and platelet count 246 MCV 91. Neutrophils and lymphocytes normal.   9/28/23 labs were sent to me. Creatinine 0.85, sodium 139, potassium 4.3, bilirubin 0.7, alkaline phosphatase 72, AST 25, ALT 37. Goal for the AST and ALT is less than 35. The hepatitis B viral load was not detected. Complete blood count showing white blood cells 8.2, red blood cells 4.8, hemoglobin 14.6, MCV 93, platelets 204. These are normal. Surface antigen remains positive. If you recall the ultrasound was still showing fatty liver and suspected this is the reason the ALT is slightly above goal. Be sure to work on the diet, exercise, and weight loss and we will review this at the follow-up.  9/21/23 [facility] ultrasound was sent to me. Was normal and size but they did see increased echogenicity in keeping with fatty liver. No focal lesion. The hepatic vasculature patent. They felt the gallbladder was normal in caliber and they did not see any gallstones. They saw a stable polyp that was 6 x 6 x 7 mm. They did not see any intra or extrahepatic biliary ductal dilatation. The right kidney and left kidney both appeared normal. Overall they saw fatty liver and need to continue to work on this with diet, exercise, and weight loss. They noted that the gallbladder polyp has been stable for 5 years which is good to see. We will review at the follow up.   7/5/23 labs were sent to me. The glucose 94, creatinine 0.77, sodium 141, potassium 5.0, bilirubin 0.5, alkaline phosphatase 74, AST 20, ALT 28. Goal for the AST and ALT is less than 35. Previously the AST 20 and ALT 37 ALT slightly lower this check. The complete blood count showing the white blood cells red blood cells hemoglobin and platelets was normal. White blood cells 8.2, red blood cells 4.96, hemoglobin 14.9, MCV 92, platelets 235. The hepatitis B virus was not detected. Surface antigen remains positive. We will see what the ultrasound in August shows and I believe there is some labs to do around that time as well. Please let us know if there are any issues.    3/9/23 March 2 [facility] from [place] ultrasound shows spleen normal at 12 cm and liver 14.8 to 14.9 cm with diffusely increased/coarsened echogenicity. Portal vessels had normal directional flow and hepatic vessels had normal flow. Spleen vessels also were patent. Gallbladder contained a round echogenic structure 0.6 x 0.7 x 0.7 cm that could be a polyp which had not significantly changed versus December 31, 2018 when allowing for differences in technique. Common bile duct was normal at 4 mm. Right kidney 12.3 cm and left kidney 13 cm. Spleen was normal at 11.1 cm. They summarized that you had a stable gallbladder polyp which is not significant change versus 2018 but she had a diffusely echogenic and coarsened appearing liver which they felt could be related to hepatitis but as you know your hepatitis B treatment so that could also be related to fat addition to liver or other process. We will discuss this and course with you and consider doing MRI as a follow up for this at the next visit.  March 2 labs show glucose of 83 which is normal BUN of 13 creatinine 0.83 sodium 143 potassium 4.7 albumin 4.8 bilirubin 0.7 alk phos 66 AST 20 and ALT 30. Ideal ALT less than 35. Hep B DNA remains not detected. White blood cell count 8.8 hemoglobin 15.1 platelet count 234 MCV 91 and normal neutrophils and lymphocytes. Hep B surface antigen remains confirm indicated but positive. Hopefully that will clear but again we will continue to monitor for that possibility.  Prior had some trouble sleeping and not an issue.  August 23 labs show glucose slightly up at 105 and possibly you were not fasting this day? Please share with primary provider. BUN of 17 creatinine 0.84 sodium 141 potassium 4.2 calcium 9.2 albumin 4.5 bilirubin 0.5 alkaline phosphatase 62 AST 19 ALT 27. Back in February her AST was 24 and ALT 34 so these are even lower. Any changes of weight or medicines on to explain this? He says that may have lost some weight. Working out. White blood cell count 9.1 hemoglobin 14.3 platelet count 235 MCV 93 and these are normal. Your mean corpuscular hemoglobin concentration which was previously normal was slightly low this time at 31.4 and please share with primary provider. Neutrophils were normal at 5.3 lymphocytes 2.8. Hep B surface antigen remains confirmed indicated and positive on the confirmation. Hep B DNA remains not detected.   February 22 labs able to be tracked down. White blood cell count 9 hemoglobin 13.5 platelet count 217 MCV 93 and neutrophils 5.7 and lymphocytes 2.1. These are all normal range. Glucose better at 99 BUN is 17 creatinine 0.87 sodium 139 potassium 4.6 chloride 103 CO2 of 22 albumin 4.7 bilirubin 0.6 alkaline phosphatase 64 AST 24 ALT 34. Previously alk phos 58 AST 20 and ALT 30 so these labs are very close to that size but slightly higher. Ideal ALT is still less than 35 so doing well. Hep B surface antigen is positive. Have seen in many pts, that during winter some weight gain and decreased exercise can affect the liver labs. Hopefully better weather now will allow for more ability to do exercise and work on the weight to help this.  Last visit he thought had gained 10 pounds and he thinks he has lost some of it.  February 17 [place] [facility] report in. Liver showed slightly increased echogenicity without definite focal lesion. Spleen was normal at 12.3 cm. Liver vessels patent. Overall they felt the liver was fatty and that you had patent vessels.  Aug 20 2021 glu 109 and bun 23 and cr 0.83 and na 141 and k 4.4 and cl 105 and co2 22 and alb 4.7 and tb 0.4 and alk 58 and ast 20 and alt 30 and wbc 8.9 and hg 14 and mcv 93 and plat 253. hbv pending.  August 12 ultrasound shows technically limited exam. Liver measured 15 cm and the spleen was approximately 13 x 12.6 x 5 cm. Flow was seen in the portal and hepatic veins with normal physiologic direction. Hepatofugal flow was seen in the hepatic veins. Overall they did feel that the exam was technically difficult but that the patency of the vessels was seen and that the spleen was prominent at 13 cm.  He had sciatica and steroids shots x 2 for this pain prior and he says sciatica is off and on.  Feb 2021: u.s: no focal hepatic abnormality. No liver lesions. Liver appears unchanged. Gallbladder polyp 7mm without significant change from prior exam, Spleen normal in size. No kidney hydronephrosis seen. Pancreas obscured by gas. Common duct normal.  Jan 16 2020 liver mildly increased in echogenicity and liver wnl. Right lobe 14cm and no focal mass. Stable gb focus 7mm and no gallstones. cbd 4mm. No evidence of hydronephrosis. spleen 12.5cmx12.8x5.6cm. Stable gb polyp.  Shiv 15 2020 wbc 8.2 hg 14.1 and plat 239 and glu 99 and cr 0.79 and na 140 and k 4.5 and cl 103 and co2 21 and alb 4.6 and tb 0.4 and alk 58 and ast 22 and alt 30 hbv dna not detected.  Dec 4 2019 wbc 7.5 hg 14 and plat 225. glu 95 and cr 0.81 and na 140 and k 4.6 and cl 102 and co2 21 and alb 4.7 and tb 0.7 and 59 andast 21 and alt 27 hbv dna not detected.  Plan: 1. Await the u.s that he did. Says he is seeing local surgeon now also. 2. Pt will do hbv dna as not run/. 3. Do labs in 6m. 4. Pt will do imaging in 6m. 5. Stay on vemlidy.  Duration of the visit was 34 minutes with 15 minutes of chart prep and 19 minutes by greg with time spent for visit reviewing recent records, discussing their current status and the future plans for the patient.

## 2025-06-18 ENCOUNTER — TELEPHONE ENCOUNTER (OUTPATIENT)
Dept: URBAN - METROPOLITAN AREA CLINIC 86 | Facility: CLINIC | Age: 40
End: 2025-06-18

## 2025-06-18 NOTE — HPI-TODAY'S VISIT:
Dear Marita Sabillon,    Sho 10 u.s: Sent in from [place].   They mentioned that your IVC was unremarkable, aorta was unremarkable to the extent visualized, and portal system had normal hepatopedal flow.     Liver appeared to be within normal limits to them in contour but did have mildly diffuse hyperechoic parenchyma which would suggest fatty liver.  There were no suspicious liver lesions.    No gallstones were seen.  You did have 2 gallbladder polyps seen versus aludge balls that were seen with largest measuring 8 mm in diameter and gallbladder wall was 2 mm.  Negative Soto sign.  No bile duct dilation seen and common bile duct 4 mm.    Right kidney 12.8 cm with no hydronephrosis.    Overall, liver appeared to be fatty and stable to them but you still need to work on that fatty liver to make it better.  Gallbladder wall polyps versus sludge ball seen measuring up to 8 mm.  They were felt to be stable.   Important to follow this up in 6 months.    I saw that you do not have a follow-up appointment made for December given the schedule issues in December with the holidays and I would recommend that you make the appointment now as those appointments may be harder to get as time gets closer.    Please call the office [phone] extension [phone] to make that appointment.    Dr. Gonzalez